# Patient Record
Sex: FEMALE | NOT HISPANIC OR LATINO | URBAN - METROPOLITAN AREA
[De-identification: names, ages, dates, MRNs, and addresses within clinical notes are randomized per-mention and may not be internally consistent; named-entity substitution may affect disease eponyms.]

---

## 2022-11-08 ENCOUNTER — TELEPHONE (OUTPATIENT)
Dept: FAMILY MEDICINE | Facility: CLINIC | Age: 37
End: 2022-11-08
Payer: OTHER GOVERNMENT

## 2022-11-08 NOTE — TELEPHONE ENCOUNTER
----- Message from Monserrat Morris sent at 2022  8:55 AM CST -----  Contact: Self  Heaven Dennis  MRN: [id number]  : 1985  PCP: No primary care provider on file.  Home Phone      [phone]  Work Phone      [phone]  Mobile          [phone]      MESSAGE:  Pt called needing to schedule new pt appt with provider. She is [employer] needing to set up an appt to speak with a doctor about a lump she has in the middle of her shoulder blades in her back making it difficult to sleep at night. Also, she has meds that needs to be refilled.       Phone:  [phone]

## 2022-12-27 ENCOUNTER — LAB VISIT (OUTPATIENT)
Dept: LAB | Facility: HOSPITAL | Age: 37
End: 2022-12-27
Attending: FAMILY MEDICINE
Payer: OTHER GOVERNMENT

## 2022-12-27 ENCOUNTER — OFFICE VISIT (OUTPATIENT)
Dept: FAMILY MEDICINE | Facility: CLINIC | Age: 37
End: 2022-12-27
Payer: OTHER GOVERNMENT

## 2022-12-27 VITALS
SYSTOLIC BLOOD PRESSURE: 129 MMHG | BODY MASS INDEX: 38.46 KG/M2 | HEART RATE: 90 BPM | RESPIRATION RATE: 16 BRPM | DIASTOLIC BLOOD PRESSURE: 84 MMHG | HEIGHT: 66 IN | WEIGHT: 239.31 LBS

## 2022-12-27 DIAGNOSIS — S46.812D STRAIN OF LEFT TRAPEZIUS MUSCLE, SUBSEQUENT ENCOUNTER: Primary | ICD-10-CM

## 2022-12-27 DIAGNOSIS — E28.2 PCOS (POLYCYSTIC OVARIAN SYNDROME): ICD-10-CM

## 2022-12-27 DIAGNOSIS — Z02.1 PHYSICAL EXAM, PRE-EMPLOYMENT: ICD-10-CM

## 2022-12-27 LAB
ALBUMIN SERPL BCP-MCNC: 3.9 G/DL (ref 3.5–5.2)
ALP SERPL-CCNC: 61 U/L (ref 55–135)
ALT SERPL W/O P-5'-P-CCNC: 17 U/L (ref 10–44)
ANION GAP SERPL CALC-SCNC: 10 MMOL/L (ref 8–16)
AST SERPL-CCNC: 19 U/L (ref 10–40)
BACTERIA #/AREA URNS HPF: ABNORMAL /HPF
BASOPHILS # BLD AUTO: 0.06 K/UL (ref 0–0.2)
BASOPHILS NFR BLD: 0.6 % (ref 0–1.9)
BILIRUB SERPL-MCNC: 0.3 MG/DL (ref 0.1–1)
BILIRUB UR QL STRIP: NEGATIVE
BUN SERPL-MCNC: 9 MG/DL (ref 6–20)
CALCIUM SERPL-MCNC: 9.9 MG/DL (ref 8.7–10.5)
CHLORIDE SERPL-SCNC: 104 MMOL/L (ref 95–110)
CHOLEST SERPL-MCNC: 225 MG/DL (ref 120–199)
CHOLEST/HDLC SERPL: 3.6 {RATIO} (ref 2–5)
CLARITY UR: ABNORMAL
CO2 SERPL-SCNC: 25 MMOL/L (ref 23–29)
COLOR UR: YELLOW
CREAT SERPL-MCNC: 0.9 MG/DL (ref 0.5–1.4)
DIFFERENTIAL METHOD: NORMAL
EOSINOPHIL # BLD AUTO: 0.1 K/UL (ref 0–0.5)
EOSINOPHIL NFR BLD: 0.7 % (ref 0–8)
ERYTHROCYTE [DISTWIDTH] IN BLOOD BY AUTOMATED COUNT: 13.5 % (ref 11.5–14.5)
EST. GFR  (NO RACE VARIABLE): >60 ML/MIN/1.73 M^2
ESTIMATED AVG GLUCOSE: 108 MG/DL (ref 68–131)
GLUCOSE SERPL-MCNC: 88 MG/DL (ref 70–110)
GLUCOSE UR QL STRIP: NEGATIVE
HBA1C MFR BLD: 5.4 % (ref 4–5.6)
HCT VFR BLD AUTO: 39.2 % (ref 37–48.5)
HDLC SERPL-MCNC: 63 MG/DL (ref 40–75)
HDLC SERPL: 28 % (ref 20–50)
HGB BLD-MCNC: 13.1 G/DL (ref 12–16)
HGB UR QL STRIP: ABNORMAL
IMM GRANULOCYTES # BLD AUTO: 0.02 K/UL (ref 0–0.04)
IMM GRANULOCYTES NFR BLD AUTO: 0.2 % (ref 0–0.5)
KETONES UR QL STRIP: NEGATIVE
LDH SERPL L TO P-CCNC: 175 U/L (ref 110–260)
LDLC SERPL CALC-MCNC: 145.8 MG/DL (ref 63–159)
LEUKOCYTE ESTERASE UR QL STRIP: ABNORMAL
LYMPHOCYTES # BLD AUTO: 2 K/UL (ref 1–4.8)
LYMPHOCYTES NFR BLD: 20.2 % (ref 18–48)
MCH RBC QN AUTO: 30.8 PG (ref 27–31)
MCHC RBC AUTO-ENTMCNC: 33.4 G/DL (ref 32–36)
MCV RBC AUTO: 92 FL (ref 82–98)
MICROSCOPIC COMMENT: ABNORMAL
MONOCYTES # BLD AUTO: 0.8 K/UL (ref 0.3–1)
MONOCYTES NFR BLD: 7.5 % (ref 4–15)
NEUTROPHILS # BLD AUTO: 7.1 K/UL (ref 1.8–7.7)
NEUTROPHILS NFR BLD: 70.8 % (ref 38–73)
NITRITE UR QL STRIP: POSITIVE
NONHDLC SERPL-MCNC: 162 MG/DL
NRBC BLD-RTO: 0 /100 WBC
PH UR STRIP: 7 [PH] (ref 5–8)
PLATELET # BLD AUTO: 308 K/UL (ref 150–450)
PMV BLD AUTO: 9.7 FL (ref 9.2–12.9)
POTASSIUM SERPL-SCNC: 4.2 MMOL/L (ref 3.5–5.1)
PROT SERPL-MCNC: 7.1 G/DL (ref 6–8.4)
PROT UR QL STRIP: ABNORMAL
RBC # BLD AUTO: 4.26 M/UL (ref 4–5.4)
RBC #/AREA URNS HPF: 5 /HPF (ref 0–4)
SODIUM SERPL-SCNC: 139 MMOL/L (ref 136–145)
SP GR UR STRIP: 1.02 (ref 1–1.03)
SQUAMOUS #/AREA URNS HPF: >100 /HPF
TRIGL SERPL-MCNC: 81 MG/DL (ref 30–150)
TSH SERPL DL<=0.005 MIU/L-ACNC: 1.93 UIU/ML (ref 0.4–4)
URN SPEC COLLECT METH UR: ABNORMAL
UROBILINOGEN UR STRIP-ACNC: NEGATIVE EU/DL
WBC # BLD AUTO: 10.08 K/UL (ref 3.9–12.7)
WBC #/AREA URNS HPF: >100 /HPF (ref 0–5)

## 2022-12-27 PROCEDURE — 81000 URINALYSIS NONAUTO W/SCOPE: CPT | Performed by: FAMILY MEDICINE

## 2022-12-27 PROCEDURE — 80061 LIPID PANEL: CPT | Performed by: FAMILY MEDICINE

## 2022-12-27 PROCEDURE — 83525 ASSAY OF INSULIN: CPT | Performed by: FAMILY MEDICINE

## 2022-12-27 PROCEDURE — 36415 COLL VENOUS BLD VENIPUNCTURE: CPT | Performed by: FAMILY MEDICINE

## 2022-12-27 PROCEDURE — 99999 PR PBB SHADOW E&M-EST. PATIENT-LVL III: CPT | Mod: PBBFAC,,, | Performed by: FAMILY MEDICINE

## 2022-12-27 PROCEDURE — 99999 PR PBB SHADOW E&M-EST. PATIENT-LVL III: ICD-10-PCS | Mod: PBBFAC,,, | Performed by: FAMILY MEDICINE

## 2022-12-27 PROCEDURE — 83036 HEMOGLOBIN GLYCOSYLATED A1C: CPT | Performed by: FAMILY MEDICINE

## 2022-12-27 PROCEDURE — 99213 OFFICE O/P EST LOW 20 MIN: CPT | Mod: PBBFAC | Performed by: FAMILY MEDICINE

## 2022-12-27 PROCEDURE — 80053 COMPREHEN METABOLIC PANEL: CPT | Performed by: FAMILY MEDICINE

## 2022-12-27 PROCEDURE — 83615 LACTATE (LD) (LDH) ENZYME: CPT | Performed by: FAMILY MEDICINE

## 2022-12-27 PROCEDURE — 99203 OFFICE O/P NEW LOW 30 MIN: CPT | Mod: S$PBB,,, | Performed by: FAMILY MEDICINE

## 2022-12-27 PROCEDURE — 85025 COMPLETE CBC W/AUTO DIFF WBC: CPT | Performed by: FAMILY MEDICINE

## 2022-12-27 PROCEDURE — 99203 PR OFFICE/OUTPT VISIT, NEW, LEVL III, 30-44 MIN: ICD-10-PCS | Mod: S$PBB,,, | Performed by: FAMILY MEDICINE

## 2022-12-27 PROCEDURE — 84443 ASSAY THYROID STIM HORMONE: CPT | Performed by: FAMILY MEDICINE

## 2022-12-27 RX ORDER — METHOCARBAMOL 500 MG/1
500 TABLET, FILM COATED ORAL 4 TIMES DAILY
Qty: 40 TABLET | Refills: 0 | Status: SHIPPED | OUTPATIENT
Start: 2022-12-27 | End: 2023-01-06

## 2022-12-27 RX ORDER — METFORMIN HYDROCHLORIDE 500 MG/1
TABLET, EXTENDED RELEASE ORAL
COMMUNITY
End: 2022-12-27

## 2022-12-27 RX ORDER — METFORMIN HYDROCHLORIDE 500 MG/1
1000 TABLET, EXTENDED RELEASE ORAL
Qty: 180 TABLET | Refills: 3 | Status: SHIPPED | OUTPATIENT
Start: 2022-12-27 | End: 2023-05-23

## 2022-12-27 RX ORDER — CYCLOBENZAPRINE HCL 10 MG
10 TABLET ORAL 3 TIMES DAILY
COMMUNITY
Start: 2022-11-07 | End: 2022-12-27

## 2022-12-27 RX ORDER — IBUPROFEN 800 MG/1
800 TABLET ORAL 3 TIMES DAILY
Qty: 21 TABLET | Refills: 0 | Status: SHIPPED | OUTPATIENT
Start: 2022-12-27 | End: 2023-01-03 | Stop reason: SDUPTHER

## 2022-12-27 NOTE — PROGRESS NOTES
Subjective:       Patient ID: Heaven Dennis is a 37 y.o. female.    Chief Complaint: Establish Care    HPI  37 year old female comes in to establish care. She is treated for pcos and has been for some time. She takes metformin and notes that she feels well with this medicine overall. She is currently having issues with her left shoulder - no injury, but radiation of pain from her neck to her shoulder. No improvement/resolution after treatment via UC (Flexeril and prn ibuprofen).    PMH, PSH, ALLERGIES, SH, FH reviewed in nurse's notes above  Medications reconciled in the nurse's notes      Review of Systems   Constitutional:  Negative for chills and fever.   HENT:  Negative for congestion, ear pain, postnasal drip, rhinorrhea, sore throat and trouble swallowing.    Eyes:  Negative for redness and itching.   Respiratory:  Negative for cough, shortness of breath and wheezing.    Cardiovascular:  Negative for chest pain and palpitations.   Gastrointestinal:  Negative for abdominal pain, diarrhea, nausea and vomiting.   Genitourinary:  Negative for dysuria and frequency.   Musculoskeletal:  Positive for myalgias and neck pain.   Skin:  Negative for rash.   Neurological:  Negative for weakness and headaches.     Objective:      Physical Exam  Vitals and nursing note reviewed.   Constitutional:       General: She is not in acute distress.     Appearance: She is well-developed. She is obese.   HENT:      Head: Normocephalic and atraumatic.   Eyes:      Conjunctiva/sclera: Conjunctivae normal.      Pupils: Pupils are equal, round, and reactive to light.   Neck:      Thyroid: No thyromegaly.   Cardiovascular:      Rate and Rhythm: Normal rate and regular rhythm.      Heart sounds: Normal heart sounds.   Pulmonary:      Effort: Pulmonary effort is normal. No respiratory distress.      Breath sounds: Normal breath sounds. No wheezing.   Abdominal:      General: Bowel sounds are normal.      Palpations: Abdomen is soft.       Tenderness: There is no abdominal tenderness.   Musculoskeletal:         General: Tenderness present. Normal range of motion.      Cervical back: Normal range of motion and neck supple.   Lymphadenopathy:      Cervical: No cervical adenopathy.   Skin:     General: Skin is warm and dry.      Findings: No rash.   Neurological:      Mental Status: She is alert and oriented to person, place, and time.   Psychiatric:         Behavior: Behavior normal.        Assessment/Plan:       Problem List Items Addressed This Visit          Endocrine    PCOS (polycystic ovarian syndrome)    Relevant Orders    TSH (Completed)    Hemoglobin A1C (Completed)    INSULIN, RANDOM (Completed)     Other Visit Diagnoses       Strain of left trapezius muscle, subsequent encounter    -  Primary    Physical exam, pre-employment        Relevant Orders    Lipid Panel (Completed)    Comprehensive Metabolic Panel (Completed)    CBC Auto Differential (Completed)    TSH (Completed)    Lactate Dehydrogenase (Completed)    Urinalysis (Completed)        Omsep labs ordered.    Labs for pcos.    Left neck strain - ibuprofen/flexeril - scheduled nsaids. If no improvement, will need pt.    RTC if condition acutely worsens or any other concerns, otherwise RTC as scheduled

## 2022-12-28 LAB
INSULIN COLLECTION INTERVAL: 4
INSULIN SERPL-ACNC: 4.7 UU/ML

## 2022-12-30 RX ORDER — SULFAMETHOXAZOLE AND TRIMETHOPRIM 800; 160 MG/1; MG/1
1 TABLET ORAL 2 TIMES DAILY
Qty: 6 TABLET | Refills: 0 | Status: SHIPPED | OUTPATIENT
Start: 2022-12-30 | End: 2023-01-02

## 2023-01-03 RX ORDER — IBUPROFEN 800 MG/1
800 TABLET ORAL 3 TIMES DAILY
Qty: 21 TABLET | Refills: 0 | Status: SHIPPED | OUTPATIENT
Start: 2023-01-03 | End: 2023-01-10

## 2023-01-03 NOTE — TELEPHONE ENCOUNTER
LOV 12/27/2022    Pt requesting RX refill for ibuprofen (ADVIL,MOTRIN) 800 MG tablet      pharmacy confirmed   rx pending  please advise

## 2023-01-13 ENCOUNTER — TELEPHONE (OUTPATIENT)
Dept: FAMILY MEDICINE | Facility: CLINIC | Age: 38
End: 2023-01-13
Payer: OTHER GOVERNMENT

## 2023-05-23 ENCOUNTER — OFFICE VISIT (OUTPATIENT)
Dept: FAMILY MEDICINE | Facility: CLINIC | Age: 38
End: 2023-05-23
Payer: OTHER GOVERNMENT

## 2023-05-23 VITALS
HEIGHT: 66 IN | RESPIRATION RATE: 16 BRPM | BODY MASS INDEX: 38.39 KG/M2 | SYSTOLIC BLOOD PRESSURE: 116 MMHG | DIASTOLIC BLOOD PRESSURE: 62 MMHG | WEIGHT: 238.88 LBS | HEART RATE: 100 BPM

## 2023-05-23 DIAGNOSIS — E28.2 PCOS (POLYCYSTIC OVARIAN SYNDROME): ICD-10-CM

## 2023-05-23 DIAGNOSIS — E88.819 INSULIN RESISTANCE: Primary | ICD-10-CM

## 2023-05-23 PROCEDURE — 99213 PR OFFICE/OUTPT VISIT, EST, LEVL III, 20-29 MIN: ICD-10-PCS | Mod: S$PBB,,, | Performed by: FAMILY MEDICINE

## 2023-05-23 PROCEDURE — 99213 OFFICE O/P EST LOW 20 MIN: CPT | Mod: S$PBB,,, | Performed by: FAMILY MEDICINE

## 2023-05-23 PROCEDURE — 99999 PR PBB SHADOW E&M-EST. PATIENT-LVL III: CPT | Mod: PBBFAC,,, | Performed by: FAMILY MEDICINE

## 2023-05-23 PROCEDURE — 99213 OFFICE O/P EST LOW 20 MIN: CPT | Mod: PBBFAC | Performed by: FAMILY MEDICINE

## 2023-05-23 PROCEDURE — 99999 PR PBB SHADOW E&M-EST. PATIENT-LVL III: ICD-10-PCS | Mod: PBBFAC,,, | Performed by: FAMILY MEDICINE

## 2023-05-23 RX ORDER — METFORMIN HYDROCHLORIDE 500 MG/1
500 TABLET, EXTENDED RELEASE ORAL EVERY OTHER DAY
Qty: 45 TABLET | Refills: 3 | Status: SHIPPED | OUTPATIENT
Start: 2023-05-23 | End: 2024-05-22

## 2023-05-23 NOTE — PROGRESS NOTES
Subjective:       Patient ID: Heaven Dennis is a 37 y.o. female.    Chief Complaint: Medication Problem (Patient states now that she has lost some weight and has been working out she feels like her mucles are cramping more and she feels more sluggish when she takes her metformin. States she feels better when she doesn't take it.)    HPI  37 year old female comes in to discuss her meds. She has known pcos and notes that she has resumed meformin as discussed at our last visit. She doesn't tolerate this well and has dropped to 1 tab every other day. She has noticed worsening of her cycle since doing this. She is concerned about her weigh t- bmi > 38 and would like to discuss use of a GLP1.    PMH, PSH, ALLERGIES, SH, FH reviewed in nurse's notes above  Medications reconciled in the nurse's notes      Review of Systems   Constitutional:  Positive for unexpected weight change. Negative for chills and fever.   HENT:  Negative for congestion, ear pain, postnasal drip, rhinorrhea, sore throat and trouble swallowing.    Eyes:  Negative for redness and itching.   Respiratory:  Negative for cough, shortness of breath and wheezing.    Cardiovascular:  Negative for chest pain and palpitations.   Gastrointestinal:  Negative for abdominal pain, diarrhea, nausea and vomiting.   Genitourinary:  Positive for menstrual problem. Negative for dysuria and frequency.   Skin:  Negative for rash.   Neurological:  Negative for weakness and headaches.     Objective:      Physical Exam  Vitals and nursing note reviewed.   Constitutional:       General: She is not in acute distress.     Appearance: She is well-developed. She is obese.   HENT:      Head: Normocephalic and atraumatic.   Eyes:      Conjunctiva/sclera: Conjunctivae normal.      Pupils: Pupils are equal, round, and reactive to light.   Neck:      Thyroid: No thyromegaly.   Cardiovascular:      Rate and Rhythm: Normal rate and regular rhythm.      Heart sounds: Normal heart sounds.    Pulmonary:      Effort: Pulmonary effort is normal. No respiratory distress.      Breath sounds: Normal breath sounds. No wheezing.   Abdominal:      General: Bowel sounds are normal.      Palpations: Abdomen is soft.      Tenderness: There is no abdominal tenderness.   Musculoskeletal:         General: Normal range of motion.      Cervical back: Normal range of motion and neck supple.   Lymphadenopathy:      Cervical: No cervical adenopathy.   Skin:     General: Skin is warm and dry.      Findings: No rash.   Neurological:      Mental Status: She is alert and oriented to person, place, and time.   Psychiatric:         Behavior: Behavior normal.        Assessment/Plan:       Problem List Items Addressed This Visit          Endocrine    PCOS (polycystic ovarian syndrome)     Other Visit Diagnoses       Insulin resistance    -  Primary    Relevant Orders    INSULIN, RANDOM        Will try to get semaglutide, pending labs, will use for insulin resistance/pcos vs wegovy for weight.    RTC if condition acutely worsens or any other concerns, otherwise RTC as scheduled

## 2023-05-24 ENCOUNTER — LAB VISIT (OUTPATIENT)
Dept: LAB | Facility: HOSPITAL | Age: 38
End: 2023-05-24
Attending: FAMILY MEDICINE
Payer: OTHER GOVERNMENT

## 2023-05-24 ENCOUNTER — PATIENT MESSAGE (OUTPATIENT)
Dept: FAMILY MEDICINE | Facility: CLINIC | Age: 38
End: 2023-05-24
Payer: OTHER GOVERNMENT

## 2023-05-24 DIAGNOSIS — E88.819 INSULIN RESISTANCE: ICD-10-CM

## 2023-05-24 LAB
INSULIN COLLECTION INTERVAL: NORMAL
INSULIN SERPL-ACNC: 11.7 UU/ML

## 2023-05-24 PROCEDURE — 83525 ASSAY OF INSULIN: CPT | Performed by: FAMILY MEDICINE

## 2023-05-24 PROCEDURE — 36415 COLL VENOUS BLD VENIPUNCTURE: CPT | Performed by: FAMILY MEDICINE

## 2023-06-06 DIAGNOSIS — R73.03 PREDIABETES: Primary | ICD-10-CM

## 2023-06-06 RX ORDER — SEMAGLUTIDE 0.68 MG/ML
0.5 INJECTION, SOLUTION SUBCUTANEOUS
Qty: 3 ML | Refills: 0 | Status: SHIPPED | OUTPATIENT
Start: 2023-06-06 | End: 2023-06-08

## 2023-06-08 RX ORDER — DULAGLUTIDE 0.75 MG/.5ML
0.75 INJECTION, SOLUTION SUBCUTANEOUS
Qty: 4 PEN | Refills: 11 | Status: SHIPPED | OUTPATIENT
Start: 2023-06-08 | End: 2024-06-07

## 2023-06-08 NOTE — PROGRESS NOTES
Spoke with patient and she states that she spoke with [facility] and they state that a form is available to fill out for ozempic.   Patient wanting to know the difference between ozempic and trulicity because she read that ozempic controls sugar better.   Patient will contact insurance to gather more info about these drugs.

## 2023-06-19 ENCOUNTER — PATIENT MESSAGE (OUTPATIENT)
Dept: FAMILY MEDICINE | Facility: CLINIC | Age: 38
End: 2023-06-19
Payer: OTHER GOVERNMENT

## 2023-06-19 NOTE — TELEPHONE ENCOUNTER
Ozempic denied due to patient not having DM2 and trulicity sent in.   Patient states she researched trulicity and decided that is not what she wants to take. Patient has questions about the difference in these medication since trulicity was not discussed in the visit.     Please reach out to patient about these medications.   Thanks!        Case request# [id number]  Denial reason: patient does not have dx of DM2

## 2023-06-21 ENCOUNTER — PATIENT MESSAGE (OUTPATIENT)
Dept: FAMILY MEDICINE | Facility: CLINIC | Age: 38
End: 2023-06-21
Payer: OTHER GOVERNMENT

## 2023-07-11 ENCOUNTER — PATIENT MESSAGE (OUTPATIENT)
Dept: ADMINISTRATIVE | Facility: HOSPITAL | Age: 38
End: 2023-07-11
Payer: OTHER GOVERNMENT

## 2023-07-25 ENCOUNTER — PATIENT OUTREACH (OUTPATIENT)
Dept: ADMINISTRATIVE | Facility: HOSPITAL | Age: 38
End: 2023-07-25
Payer: OTHER GOVERNMENT

## 2023-07-25 NOTE — PROGRESS NOTES
Chart reviewed, immunization record updated.  No new results noted on [facility] or [facility] web site.  Care Everywhere updated.   Patient care coordination note  LOV with PCP 5/23/2023.  Discussed Cervical Cancer Screening with patient, scheduled appointment with Dr. Lina Aragon on 9/29/2023, patient states she will see appointment details through her [facility] portal.

## 2023-11-14 ENCOUNTER — PATIENT OUTREACH (OUTPATIENT)
Dept: ADMINISTRATIVE | Facility: HOSPITAL | Age: 38
End: 2023-11-14
Payer: OTHER GOVERNMENT

## 2023-11-14 NOTE — PROGRESS NOTES
Chart reviewed, Links Registry request failed  No new results noted on [facility] or [facility] web site.  Care Everywhere updated.   Patient care coordination note  LOV with PCP 5/23/2023.  Spoke to patient regarding rescheduling well woman exam, patient states she is currently working in [place] and is unsure when she will return. Patient states she will call when ready to schedule pap smear.    electronic

## 2024-02-28 DIAGNOSIS — R73.03 PREDIABETES: ICD-10-CM

## 2024-03-05 LAB
HPV APTIMA: NEGATIVE
PAP RECOMMENDATION EXT: NORMAL

## 2024-04-08 ENCOUNTER — PATIENT MESSAGE (OUTPATIENT)
Dept: FAMILY MEDICINE | Facility: CLINIC | Age: 39
End: 2024-04-08
Payer: OTHER GOVERNMENT

## 2024-04-08 ENCOUNTER — PATIENT MESSAGE (OUTPATIENT)
Dept: ADMINISTRATIVE | Facility: HOSPITAL | Age: 39
End: 2024-04-08
Payer: OTHER GOVERNMENT

## 2024-04-09 ENCOUNTER — PATIENT OUTREACH (OUTPATIENT)
Dept: ADMINISTRATIVE | Facility: HOSPITAL | Age: 39
End: 2024-04-09
Payer: OTHER GOVERNMENT

## 2024-04-09 NOTE — PROGRESS NOTES
Chart reviewed, Links registry does not have data for this patient.  No new results noted on [facility] web site.  Uploaded Pap Smear/HPV collected on 3/5/2024 from [facility], updated to .  Care Everywhere updated.   Patient care coordination note  Upcoming PCP visit updated.  Next PCP visit 5/2/2024.  Dr. Trinidad Begum added to patient care team.  Patient replied to email campaign stating pap smear collected.

## 2024-05-02 ENCOUNTER — CLINICAL SUPPORT (OUTPATIENT)
Dept: FAMILY MEDICINE | Facility: CLINIC | Age: 39
End: 2024-05-02
Payer: OTHER GOVERNMENT

## 2024-05-02 ENCOUNTER — OFFICE VISIT (OUTPATIENT)
Dept: FAMILY MEDICINE | Facility: CLINIC | Age: 39
End: 2024-05-02
Payer: OTHER GOVERNMENT

## 2024-05-02 VITALS
HEART RATE: 96 BPM | SYSTOLIC BLOOD PRESSURE: 124 MMHG | WEIGHT: 211.56 LBS | RESPIRATION RATE: 12 BRPM | DIASTOLIC BLOOD PRESSURE: 82 MMHG | BODY MASS INDEX: 34 KG/M2 | HEIGHT: 66 IN

## 2024-05-02 DIAGNOSIS — F41.1 GAD (GENERALIZED ANXIETY DISORDER): ICD-10-CM

## 2024-05-02 DIAGNOSIS — F90.0 ATTENTION DEFICIT HYPERACTIVITY DISORDER (ADHD), PREDOMINANTLY INATTENTIVE TYPE: ICD-10-CM

## 2024-05-02 DIAGNOSIS — R41.840 CONCENTRATION DEFICIT: ICD-10-CM

## 2024-05-02 DIAGNOSIS — E28.2 PCOS (POLYCYSTIC OVARIAN SYNDROME): ICD-10-CM

## 2024-05-02 DIAGNOSIS — R41.840 CONCENTRATION DEFICIT: Primary | ICD-10-CM

## 2024-05-02 LAB
ALBUMIN SERPL BCP-MCNC: 4.1 G/DL (ref 3.5–5.2)
ALP SERPL-CCNC: 57 U/L (ref 55–135)
ALT SERPL W/O P-5'-P-CCNC: 12 U/L (ref 10–44)
ANION GAP SERPL CALC-SCNC: 9 MMOL/L (ref 8–16)
AST SERPL-CCNC: 16 U/L (ref 10–40)
BACTERIA SPEC CULT: NORMAL /HPF
BILIRUB SERPL-MCNC: 0.4 MG/DL (ref 0.1–1)
BILIRUB SERPL-MCNC: NORMAL MG/DL
BLOOD URINE, POC: NORMAL
BUN SERPL-MCNC: 13 MG/DL (ref 6–20)
CALCIUM SERPL-MCNC: 10.2 MG/DL (ref 8.7–10.5)
CASTS: NORMAL
CHLORIDE SERPL-SCNC: 107 MMOL/L (ref 95–110)
CO2 SERPL-SCNC: 24 MMOL/L (ref 23–29)
COLOR, POC UA: YELLOW
CREAT SERPL-MCNC: 1 MG/DL (ref 0.5–1.4)
CRYSTALS: NORMAL
EST. GFR  (NO RACE VARIABLE): >60 ML/MIN/1.73 M^2
ESTIMATED AVG GLUCOSE: 108 MG/DL (ref 68–131)
GLUCOSE SERPL-MCNC: 86 MG/DL (ref 70–110)
GLUCOSE UR QL STRIP: NORMAL
HBA1C MFR BLD: 5.4 % (ref 4–5.6)
KETONES UR QL STRIP: NORMAL
LEUKOCYTE ESTERASE URINE, POC: NORMAL
NITRITE, POC UA: NORMAL
PH, POC UA: 6
POTASSIUM SERPL-SCNC: 3.9 MMOL/L (ref 3.5–5.1)
PROT SERPL-MCNC: 7.5 G/DL (ref 6–8.4)
PROTEIN, POC: NORMAL
RBC CELLS COUNTED: NORMAL
SODIUM SERPL-SCNC: 140 MMOL/L (ref 136–145)
SPECIFIC GRAVITY, POC UA: >=1.03
TSH SERPL DL<=0.005 MIU/L-ACNC: 1.24 UIU/ML (ref 0.4–4)
UROBILINOGEN, POC UA: 0.2
VIT B12 SERPL-MCNC: 713 PG/ML (ref 210–950)
WHITE BLOOD CELLS: NORMAL

## 2024-05-02 PROCEDURE — 99213 OFFICE O/P EST LOW 20 MIN: CPT | Mod: PBBFAC | Performed by: FAMILY MEDICINE

## 2024-05-02 PROCEDURE — 81001 URINALYSIS AUTO W/SCOPE: CPT | Mod: PBBFAC

## 2024-05-02 PROCEDURE — 99215 OFFICE O/P EST HI 40 MIN: CPT | Mod: S$PBB,,, | Performed by: FAMILY MEDICINE

## 2024-05-02 PROCEDURE — 99999 PR PBB SHADOW E&M-EST. PATIENT-LVL III: CPT | Mod: PBBFAC,,, | Performed by: FAMILY MEDICINE

## 2024-05-02 PROCEDURE — 36415 COLL VENOUS BLD VENIPUNCTURE: CPT | Mod: PBBFAC

## 2024-05-02 PROCEDURE — 84443 ASSAY THYROID STIM HORMONE: CPT | Performed by: FAMILY MEDICINE

## 2024-05-02 PROCEDURE — 81000 URINALYSIS NONAUTO W/SCOPE: CPT | Mod: PBBFAC

## 2024-05-02 PROCEDURE — 82607 VITAMIN B-12: CPT | Performed by: FAMILY MEDICINE

## 2024-05-02 PROCEDURE — 83036 HEMOGLOBIN GLYCOSYLATED A1C: CPT | Performed by: FAMILY MEDICINE

## 2024-05-02 PROCEDURE — 99999PBSHW PR PBB SHADOW TECHNICAL ONLY FILED TO HB: Mod: PBBFAC,,,

## 2024-05-02 PROCEDURE — 80053 COMPREHEN METABOLIC PANEL: CPT | Performed by: FAMILY MEDICINE

## 2024-05-02 PROCEDURE — 99999PBSHW POCT URINALYSIS, DIPSTICK OR TABLET REAGENT, AUTOMATED, WITH MICROSCOP: Mod: PBBFAC,,,

## 2024-05-02 PROCEDURE — 99999PBSHW POCT URINE SEDIMENT EXAM: Mod: PBBFAC,,,

## 2024-05-02 RX ORDER — IBUPROFEN 800 MG/1
800 TABLET ORAL 3 TIMES DAILY
Qty: 30 TABLET | Refills: 0 | Status: SHIPPED | OUTPATIENT
Start: 2024-05-02 | End: 2024-05-12

## 2024-05-02 RX ORDER — ATOMOXETINE 40 MG/1
40 CAPSULE ORAL DAILY
Qty: 30 CAPSULE | Refills: 0 | Status: SHIPPED | OUTPATIENT
Start: 2024-05-02 | End: 2024-05-28

## 2024-05-02 NOTE — PROGRESS NOTES
Subjective:       Patient ID: Heaven Dennis is a 38 y.o. female.    Chief Complaint: Plantar Fasciitis (Pt here with ongoing pain with plantar fascitis hasn't seen podiatry in 2 years), ADHD (Poss adhd via consular at work  ), and Urinary Tract Infection (Pt here with poss uti no symptoms. Would like test just to be safe. )    History of Present Illness  The patient presents for evaluation of multiple medical concerns.    The patient reports recurrent foot pain, specifically in the heel region. She has a history of plantar fasciitis, with the last episode occurring approximately 2 years ago. Despite receiving an injection and utilizing orthopedic insoles, the pain has recently recurred. She has experienced significant weight loss, which she believes may be contributing to her discomfort. Despite wearing boots daily at work, she continues to utilize the insoles. Despite her efforts, she has refrained from running or engaging in any new activities. The pain is intermittent, but intensifies upon weight-bearing, particularly in the morning. The pain is not severe after prolonged periods of sitting, but intensify upon standing. She has attempted to use a brace and another insole, but finds them less effective with her boots. She has resorted to Advil for intermittent relief.    The patient reports experiencing mental intrusive thoughts. Her counselor suspects she may have ADHD. She has been experiencing significant stress, including filing harassment charges and filing EO complaints. Her mother, who resides with her, is living with her and is in the process of remodeling her house. She is planning to relocate to [place] for 1 year and hopes to drop her jail letter. She reports difficulty remembering dates and frequently writes her mother's name, which she finds stressful.    The patient expresses concern about a possible urinary tract infection (UTI). She recalls a severe UTI during a miscarriage, but did not  experience any dysuria. Her urine has been dark in color, which she attributes to hot weather.    Supplemental Information  She has lost almost 30 pounds through dietary changes. She is on Trulicity for PCOS. She was put on Sonata by a sleep doctor but had to stop because of being [employer] of [employer].   She quit smoking cigarettes in 12/2023, but she still vapes every once in a while.    Objective:      Physical Exam  Vital Signs  Patient's weight is 211.      Physical Exam  Vitals and nursing note reviewed.   Constitutional:       General: She is not in acute distress.     Appearance: She is well-developed.   HENT:      Head: Normocephalic and atraumatic.   Eyes:      Conjunctiva/sclera: Conjunctivae normal.      Pupils: Pupils are equal, round, and reactive to light.   Neck:      Thyroid: No thyromegaly.   Cardiovascular:      Rate and Rhythm: Normal rate and regular rhythm.      Heart sounds: Normal heart sounds.   Pulmonary:      Effort: Pulmonary effort is normal. No respiratory distress.      Breath sounds: Normal breath sounds. No wheezing.   Abdominal:      General: Bowel sounds are normal.      Palpations: Abdomen is soft.      Tenderness: There is no abdominal tenderness.   Musculoskeletal:         General: Tenderness (left mid heel) present. Normal range of motion.      Cervical back: Normal range of motion and neck supple.   Lymphadenopathy:      Cervical: No cervical adenopathy.   Skin:     General: Skin is warm and dry.      Findings: No rash.   Neurological:      Mental Status: She is alert and oriented to person, place, and time.   Psychiatric:         Behavior: Behavior normal.         Results    Assessment:           1. Concentration deficit    2. PCOS (polycystic ovarian syndrome)    3. Attention deficit hyperactivity disorder (ADHD), predominantly inattentive type    4. JAN (generalized anxiety disorder)        Plan:     Assessment & Plan  1. Heel pain.  The patient's heel pain does not align  with plantar fascial pain, and it is plausible that the pain could be attributed to her new boots. A prescription for ibuprofen, to be taken with food for the next 10 days, has been issued. Injection offered - patient deferred.    2. Potential ADHD.  The patient and her counselor have discussed the potential use of stimulants. Neuropsychological testing was suggested as a potential diagnostic option. A referral to psychiatry has been made.    3. Potential UTI.  A urine sample will be collected to exclude the possibility of a UTI.    Heaven was seen today for plantar fasciitis, adhd and urinary tract infection.    Diagnoses and all orders for this visit:    Concentration deficit  -     Hemoglobin A1C; Future  -     Comprehensive Metabolic Panel; Future  -     TSH; Future  -     POCT Urine Sediment Exam; Future  -     POCT URINE DIPSTICK WITH MICROSCOPE, AUTOMATED; Future  -     Vitamin B12; Future  -     Ambulatory referral/consult to Psychiatry; Future    PCOS (polycystic ovarian syndrome)  -     Hemoglobin A1C; Future  -     Comprehensive Metabolic Panel; Future  -     TSH; Future    Attention deficit hyperactivity disorder (ADHD), predominantly inattentive type  -     Ambulatory referral/consult to Psychiatry; Future    JAN (generalized anxiety disorder)  -     Ambulatory referral/consult to Psychiatry; Future    Other orders  -     ibuprofen (ADVIL,MOTRIN) 800 MG tablet; Take 1 tablet (800 mg total) by mouth 3 (three) times daily. for 10 days      38 minutes spent with pateint    RTC if condition acutely worsens or any other concerns, otherwise RTC as scheduled      This note was generated with the assistance of ambient listening technology. Verbal consent was obtained by the patient and accompanying visitor(s) for the recording of patient appointment to facilitate this note. I attest to having reviewed and edited the generated note for accuracy, though some syntax or spelling errors may persist. Please  contact the author of this note for any clarification.    Answers submitted by the patient for this visit:  Review of Systems Questionnaire (Submitted on 4/29/2024)  activity change: Yes  unexpected weight change: No  hearing loss: Yes  rhinorrhea: Yes  trouble swallowing: No  eye discharge: No  visual disturbance: No  chest tightness: No  wheezing: No  chest pain: No  palpitations: No  blood in stool: No  constipation: Yes  vomiting: No  diarrhea: Yes  polydipsia: No  polyuria: Yes  difficulty urinating: Yes  hematuria: No  menstrual problem: No  dysuria: No  joint swelling: No  arthralgias: No  headaches: Yes  weakness: No  confusion: Yes  dysphoric mood: Yes

## 2024-05-06 ENCOUNTER — PATIENT MESSAGE (OUTPATIENT)
Dept: FAMILY MEDICINE | Facility: CLINIC | Age: 39
End: 2024-05-06
Payer: OTHER GOVERNMENT

## 2024-05-06 DIAGNOSIS — F33.1 MODERATE EPISODE OF RECURRENT MAJOR DEPRESSIVE DISORDER: Primary | ICD-10-CM

## 2024-05-06 NOTE — TELEPHONE ENCOUNTER
Psychiatry provider pt was sent to - dr.Venkata Cline in [place] is no longer practicing there do you have any other recommendations for pt?

## 2024-05-13 NOTE — TELEPHONE ENCOUNTER
Patient asking for referral to Psychiatrist be sent to someone else as the original provider is no longer in that clinic.      Please advise

## 2024-05-25 NOTE — TELEPHONE ENCOUNTER
No care due was identified.  [facility] Embedded Care Due Messages. Reference number: [id number].   5/25/2024 9:32:14 AM CDT

## 2024-05-27 DIAGNOSIS — R73.03 PREDIABETES: ICD-10-CM

## 2024-05-27 RX ORDER — DULAGLUTIDE 0.75 MG/.5ML
0.75 INJECTION, SOLUTION SUBCUTANEOUS
Qty: 12 PEN | Refills: 1 | Status: SHIPPED | OUTPATIENT
Start: 2024-05-27 | End: 2025-05-27

## 2024-05-27 NOTE — TELEPHONE ENCOUNTER
No care due was identified.  [facility] Embedded Care Due Messages. Reference number: [id number].   5/27/2024 12:00:59 AM CDT

## 2024-05-27 NOTE — TELEPHONE ENCOUNTER
Refill Decision Note   Heaven Dennis  is requesting a refill authorization.  Brief Assessment and Rationale for Refill:  Approve     Medication Therapy Plan:         Comments:     Note composed:11:54 AM 05/27/2024

## 2024-05-28 RX ORDER — ATOMOXETINE 40 MG/1
40 CAPSULE ORAL
Qty: 90 CAPSULE | Refills: 1 | Status: SHIPPED | OUTPATIENT
Start: 2024-05-28

## 2024-05-29 ENCOUNTER — PATIENT MESSAGE (OUTPATIENT)
Dept: FAMILY MEDICINE | Facility: CLINIC | Age: 39
End: 2024-05-29
Payer: OTHER GOVERNMENT

## 2024-06-05 ENCOUNTER — PATIENT MESSAGE (OUTPATIENT)
Dept: FAMILY MEDICINE | Facility: CLINIC | Age: 39
End: 2024-06-05
Payer: OTHER GOVERNMENT

## 2024-06-13 ENCOUNTER — PATIENT MESSAGE (OUTPATIENT)
Dept: FAMILY MEDICINE | Facility: CLINIC | Age: 39
End: 2024-06-13
Payer: OTHER GOVERNMENT

## 2024-06-13 DIAGNOSIS — M72.2 PLANTAR FASCIITIS OF RIGHT FOOT: Primary | ICD-10-CM

## 2024-06-25 ENCOUNTER — OFFICE VISIT (OUTPATIENT)
Dept: FAMILY MEDICINE | Facility: CLINIC | Age: 39
End: 2024-06-25
Payer: OTHER GOVERNMENT

## 2024-06-25 VITALS
WEIGHT: 214.31 LBS | DIASTOLIC BLOOD PRESSURE: 80 MMHG | RESPIRATION RATE: 12 BRPM | SYSTOLIC BLOOD PRESSURE: 114 MMHG | HEIGHT: 66 IN | BODY MASS INDEX: 34.44 KG/M2 | HEART RATE: 88 BPM

## 2024-06-25 DIAGNOSIS — S39.012A STRAIN OF LUMBAR REGION, INITIAL ENCOUNTER: Primary | ICD-10-CM

## 2024-06-25 DIAGNOSIS — M25.511 ACUTE PAIN OF RIGHT SHOULDER: ICD-10-CM

## 2024-06-25 PROCEDURE — 99999PBSHW PR PBB SHADOW TECHNICAL ONLY FILED TO HB: Mod: PBBFAC,,,

## 2024-06-25 PROCEDURE — 96372 THER/PROPH/DIAG INJ SC/IM: CPT | Mod: PBBFAC

## 2024-06-25 PROCEDURE — 99213 OFFICE O/P EST LOW 20 MIN: CPT | Mod: PBBFAC,25 | Performed by: FAMILY MEDICINE

## 2024-06-25 PROCEDURE — 99999 PR PBB SHADOW E&M-EST. PATIENT-LVL III: CPT | Mod: PBBFAC,,, | Performed by: FAMILY MEDICINE

## 2024-06-25 PROCEDURE — 99214 OFFICE O/P EST MOD 30 MIN: CPT | Mod: S$PBB,,, | Performed by: FAMILY MEDICINE

## 2024-06-25 RX ORDER — IBUPROFEN 800 MG/1
800 TABLET ORAL 3 TIMES DAILY
Qty: 30 TABLET | Refills: 0 | Status: SHIPPED | OUTPATIENT
Start: 2024-06-25

## 2024-06-25 RX ORDER — METHYLPREDNISOLONE ACETATE 40 MG/ML
40 INJECTION, SUSPENSION INTRA-ARTICULAR; INTRALESIONAL; INTRAMUSCULAR; SOFT TISSUE
Status: COMPLETED | OUTPATIENT
Start: 2024-06-25 | End: 2024-06-25

## 2024-06-25 RX ORDER — METHOCARBAMOL 500 MG/1
500 TABLET, FILM COATED ORAL 4 TIMES DAILY
Qty: 40 TABLET | Refills: 0 | Status: SHIPPED | OUTPATIENT
Start: 2024-06-25 | End: 2024-07-05

## 2024-06-25 RX ORDER — ATOMOXETINE 25 MG/1
25 CAPSULE ORAL DAILY
Qty: 30 CAPSULE | Refills: 0 | Status: SHIPPED | OUTPATIENT
Start: 2024-06-25 | End: 2024-07-25

## 2024-06-25 RX ADMIN — METHYLPREDNISOLONE ACETATE 40 MG: 40 INJECTION, SUSPENSION INTRA-ARTICULAR; INTRALESIONAL; INTRAMUSCULAR; SOFT TISSUE at 09:06

## 2024-06-25 NOTE — PROGRESS NOTES
Subjective:       Patient ID: Heaven Dennis is a 39 y.o. female.    Chief Complaint: Follow-up (1 month new medication follow up)    History of Present Illness   H    The patient experienced a fall at the end of 05/2023, during which she attempted to catch herself by grabbing a railing. Since then, she has been experiencing persistent pain in her right shoulder and bilateral lower sacral area, which radiates down her posterior right side. The pain does not extend beyond her knees, but she also suffers from plantar fasciitis, which radiates upwards, making it challenging to discern whether the pain is fully descending or radiating upward. The pain, which she describes as sharp, tingling, and spasms, intensifies when she stands or stretches after sitting for more than 30 minutes. Despite taking ibuprofen 800 mg twice daily, she has not observed any improvement in her symptoms. She has not yet tried any muscle relaxants. The fall did not result in significant bruising, but she noticed slight swelling and tightness in her back. Currently, she experiences a general tightness in her back, which is exacerbated by sitting, driving, and sleeping. She has a lift bed system, which she uses to elevate her feet, which provides some relief. However, she continues to experience tightness in her shoulder with movement. She has not observed any swelling in her leg.er concern is that she has a family history of blood clots and so that is her main concern. She wants to rule that out, but that is the only thing that she is here for and a medication review.   She vapes. She has 1 baby. She has had 3 miscarriages.   She has a family history of blood clots on her mother's side. Her mother has back issues.    Objective:      Physical Exam        Physical Exam  Vitals and nursing note reviewed.   Constitutional:       General: She is not in acute distress.     Appearance: She is well-developed.   HENT:      Head: Normocephalic and  atraumatic.   Eyes:      Conjunctiva/sclera: Conjunctivae normal.      Pupils: Pupils are equal, round, and reactive to light.   Neck:      Thyroid: No thyromegaly.   Cardiovascular:      Rate and Rhythm: Normal rate and regular rhythm.      Heart sounds: Normal heart sounds.   Pulmonary:      Effort: Pulmonary effort is normal. No respiratory distress.      Breath sounds: Normal breath sounds. No wheezing.   Abdominal:      General: Bowel sounds are normal.      Palpations: Abdomen is soft.      Tenderness: There is no abdominal tenderness.   Musculoskeletal:         General: Tenderness present. No swelling or deformity. Normal range of motion.      Cervical back: Normal range of motion and neck supple.   Lymphadenopathy:      Cervical: No cervical adenopathy.   Skin:     General: Skin is warm and dry.      Findings: No rash.   Neurological:      Mental Status: She is alert and oriented to person, place, and time.   Psychiatric:         Behavior: Behavior normal.         Results    Assessment:           1. Strain of lumbar region, initial encounter    2. Acute pain of right shoulder        Plan:     Assessment & Plan  1. Right shoulder pain, lumbar pain and hamstring strain.  Given the patient's family history of blood clots, further investigation is warranted - she has been asked to find out the etiology of her family's clotting hx. A regimen of ibuprofen, to be taken thrice daily for a week, has been prescribed. Additionally, Robaxin has been prescribed for muscle relaxation. A steroid injection will be administered today. Should the patient's condition not improve within a week, an x-ray of the back will be conducted, followed by a discussion regarding an MRI. We will initiate physical therapy.    Heaven was seen today for follow-up.    Diagnoses and all orders for this visit:    Strain of lumbar region, initial encounter  -     Ambulatory referral/consult to Physical/Occupational Therapy; Future  -      methylPREDNISolone acetate injection 40 mg    Acute pain of right shoulder  -     Ambulatory referral/consult to Physical/Occupational Therapy; Future  -     methylPREDNISolone acetate injection 40 mg    Other orders  -     atomoxetine (STRATTERA) 25 MG capsule; Take 1 capsule (25 mg total) by mouth once daily.  -     ibuprofen (ADVIL,MOTRIN) 800 MG tablet; Take 1 tablet (800 mg total) by mouth 3 (three) times daily.  -     methocarbamoL (ROBAXIN) 500 MG Tab; Take 1 tablet (500 mg total) by mouth 4 (four) times daily. for 10 days      Adhd - too sleepy on current dose of strattera.    PCOS - doing well on trulicity.    RTC if condition acutely worsens or any other concerns, otherwise RTC as scheduled      This note was generated with the assistance of ambient listening technology. Verbal consent was obtained by the patient and accompanying visitor(s) for the recording of patient appointment to facilitate this note. I attest to having reviewed and edited the generated note for accuracy, though some syntax or spelling errors may persist. Please contact the author of this note for any clarification.

## 2024-06-26 DIAGNOSIS — Z12.31 OTHER SCREENING MAMMOGRAM: ICD-10-CM

## 2024-07-03 ENCOUNTER — PATIENT MESSAGE (OUTPATIENT)
Dept: FAMILY MEDICINE | Facility: CLINIC | Age: 39
End: 2024-07-03
Payer: OTHER GOVERNMENT

## 2024-07-03 ENCOUNTER — CLINICAL SUPPORT (OUTPATIENT)
Dept: REHABILITATION | Facility: HOSPITAL | Age: 39
End: 2024-07-03
Attending: FAMILY MEDICINE
Payer: OTHER GOVERNMENT

## 2024-07-03 DIAGNOSIS — M72.2 PLANTAR FASCIITIS OF RIGHT FOOT: Primary | ICD-10-CM

## 2024-07-03 DIAGNOSIS — S39.012A STRAIN OF LUMBAR REGION, INITIAL ENCOUNTER: ICD-10-CM

## 2024-07-03 DIAGNOSIS — M25.311 SHOULDER INSTABILITY, RIGHT: Primary | ICD-10-CM

## 2024-07-03 DIAGNOSIS — R29.898 WEAKNESS OF RIGHT SHOULDER: ICD-10-CM

## 2024-07-03 DIAGNOSIS — M25.511 ACUTE PAIN OF RIGHT SHOULDER: ICD-10-CM

## 2024-07-03 PROCEDURE — 97165 OT EVAL LOW COMPLEX 30 MIN: CPT | Mod: PN

## 2024-07-03 PROCEDURE — 97110 THERAPEUTIC EXERCISES: CPT | Mod: PN

## 2024-07-03 NOTE — PLAN OF CARE
"  [facility]  Occupational Therapy Initial Evaluation    Date: 7/3/2024  Name: Heaven Dennis  Clinic Number: [id number]    Therapy Diagnosis:   Encounter Diagnoses   Name Primary?    Strain of lumbar region, initial encounter     Acute pain of right shoulder     Shoulder instability, right Yes    Weakness of right shoulder      Physician: Leilani Pat MD    Physician Orders: OT Evaluate and treat  Medical Diagnosis: Shoulder pain  Surgical Procedure and Date: NA  Evaluation Date: 7/3/2024  Insurance Authorization Period Expiration: 6/25/2024 - 6/25/2025  Plan of Care Certification Period: 6/25/2024 - 6/25/2025  Progress Note Due: 7/25/2024   Date of Return to MD: Unknown    Visit # / Visits authorized: Evaluation  FOTO: 1 / 3    Time In:10:30  Time Out: 11:15  Total Appointment Time (timed & untimed codes): 45 minutes    SUBJECTIVE     Date of Onset: ~2 months ago    History of Current Condition/Mechanism of Injury: Heaven reports: Labral surgery in 2014. She states that since this surgery she has experienced some mild pain and popping to right shoulder which she has "just lived with." However, ~2 months ago she lost her balance climbing latter at work and fell catching herself with her right upper extremity. She felt a tight pulling and pop and has been experiencing increased pain, popping, and instability since. She states that symptoms are similar to what she was experiencing prior to labral repair.     Falls: Yes    Involved Side: right   Dominant Side: Right  Imaging: Nothing recent  Prior Therapy: Previous OT following labral tear in 2014.   Occupation: [employer]  Working presently: employed  Duties:      Functional Limitations/Social History:    Previous functional status includes: Independent with all ADLs.     Current Functional Status   Home/Living environment: lives with their family      Limitation of Functional Status as follows:   ADLs/IADLs: "     - Feeding: Ind    - Bathing: MOD I    - Dressing/Grooming: MOD I    - Driving: MOD I    Pain:  Functional Pain Scale Rating 0-10:     Rest 4/10,   worst 8/10     Location: Along supraspinatus and anterior  Description: Aching, Dull, and Sharp  Aggravating Factors: Morning, Lifting, and overhead reaching.  Easing Factors: rest    Medical History:   Past Medical History:   Diagnosis Date    PCOS (polycystic ovarian syndrome)        Surgical History:    has a past surgical history that includes Fracture surgery and Cosmetic surgery.    Medications:   has a current medication list which includes the following prescription(s): atomoxetine, ibuprofen, methocarbamol, and trulicity.    Allergies:   Review of patient's allergies indicates:   Allergen Reactions    Penicillins Other (See Comments) and Anaphylaxis     unknown      Codeine Nausea And Vomiting          OBJECTIVE       Special Tests   Right   Shahid Adrian Neg   Neer's Neg   Lift Off   neg   Belly Press    Rincon Pos   Speed's Pos   Empty/Full Can Neg   Drop Arm    Painful Arc Neg   Hornblowers Neg        Active Range of Motion:  (Measured in degrees)   Right Left   Shld Flexion 155 160   Shld Extension 45 50   Shld Abduction 160 170   Shld Adduction 0 0   Shld Ext Rotation 80 90   Shld Int Rotation 75 80     Manual Muscle Test   Right Left   Shld Flexion -4/5* 5/5   Shld Extension 5/5 5/5   Shld Abduction 4-/5* 5/5   Shld Adduction 4-/5* 5/5   Shld Ext Rotation 5/5 5/5   Shld Int Rotation 5/5 5/5   Elbow Flexion 5/5 5/5   Elbow Extension 5/5 5/5        Strength (Dynamometer/Measured in pounds on Rung II) and Pinch Strength (Pinch Gauge)   7/3/2024 7/3/2024    Right Left   Composite 60 65   Lateral Pinch 10 12   Tripod Pinch 5 8       Opposition (Fine Motor Skills/Dexterity):   9 hole   - right: 21.92   - left: 18.10     Wound/Scar management: NA    Sensation: Pt reports no numbness currently but she does still experience some intermittent numbness to  median nerve distribution ever since carpal tunnel release in 2019.     Limitation/Restriction for FOTO Survey    Therapist reviewed FOTO scores for Heaven Dennis on 7/3/2024.   FOTO documents entered into EPIC - see Media section.    Limitation Score: See media section         Treatment   Total Treatment time (time-based codes) separate from Evaluation: 10 minutes    Heaven received the treatments listed below:     Therapeutic exercises to develop strength and ROM for 10 minutes, including:  - Shoulder shru reps 3#  - Shoulder retraction: 30 reps green band  - external rotation: 30 reps green band      Patient Education and Home Exercises      Education provided:   - HEP    Written Home Exercises Provided: yes.  Exercises were reviewed and Heaven was able to demonstrate them prior to the end of the session.  Heaven demonstrated good  understanding of the education provided. See EMR under Patient Instructions for exercises provided during therapy sessions.     Pt was advised to perform these exercises free of pain, and to stop performing them if pain occurs.    Patient/Family Education: role of OT, goals for OT, scheduling/cancellations - pt verbalized understanding. Discussed insurance limitations with patient.      ASSESSMENT     Heaven Dennis is a 39 y.o. female referred to outpatient occupational therapy and presents with a medical diagnosis of right shoulder pain.  Patient presents with the following therapy deficits: Decreased ROM, Decreased muscle strength, Increased pain, and shoulder instability and demonstrates limitations as described in the chart below. Following medical record review it is determined that pt will benefit from occupational therapy services in order to maximize pain free and/or functional use of right shoulder. The following goals were discussed with the patient and patient is in agreement with them as to be addressed in the treatment plan. The patient's rehab potential  is Good.     Anticipated barriers to occupational therapy: None  Pt has no cultural, educational or language barriers to learning provided.    Profile and History Assessment of Occupational Performance Level of Clinical Decision Making Complexity Score   Occupational Profile:   Heaven Dennis is a 39 y.o. female who lives with their family and is currently employed Heaven Dennis has difficulty with  ADLs and IADLs as listed previously, which  Affecting herdaily functional abilities.      Comorbidities:    has a past medical history of PCOS (polycystic ovarian syndrome).    Medical and Therapy History Review:   Brief               Performance Deficits    Physical:  Joint Mobility  Joint Stability  Muscle Power/Strength  Pain    Cognitive:  No Deficits    Psychosocial:    No Deficits     Clinical Decision Making:  low    Assessment Process:  Problem-Focused Assessments    Modification/Need for Assistance:  Not Necessary    Intervention Selection:  Limited Treatment Options       low  Based on PMHX, co morbidities , data from assessments and functional level of assistance required with task and clinical presentation directly impacting function.       The following goals were discussed with the patient and patient is in agreement with them as to be addressed in the treatment plan.       Goals:   The following goals were discussed with the patient and patient is in agreement with them as to be addressed in the treatment plan.     Short Term Goals (STGs); to be met within 4 weeks.  STG #1: Pt will report 6 out of 10 pain level at worst in right upper extremity.  STG #2: Pt will increase shoulder MMT to 4+/5 in order to improve functional use of UE.  STG #4: Pt will demonstrate independence with issued HEP.     Long Term Goals (LTGs); to be met by discharge.  LTG #1: Pt will report a pain level of 3 out of 10 at worst in right upper extremity.   LTG #2: Pt will demo improved FOTO score to predicted level.  LTG #3: Pt  will return to prior level of function for ADL.   LTG #4: Pt will increase shoulder MMT to 5/5 in all planes for improved functional use of upper extremity.   PLAN   Plan of Care Certification: 7/3/2024 to 9/3/2024.     Outpatient Occupational Therapy 1 - 2 times weekly for 8 weeks to include the following interventions: Manual therapy/joint mobilizations, Modalities for pain management, US 3 mhz, Therapeutic exercises/activities., Strengthening, Edema Control, Electrical Modalities, and Joint Protection.      Herson Collins OT      I CERTIFY THE NEED FOR THESE SERVICES FURNISHED UNDER THIS PLAN OF TREATMENT AND WHILE UNDER MY CARE  Physician's comments:      Physician's Signature: ___________________________________________________

## 2024-07-11 ENCOUNTER — CLINICAL SUPPORT (OUTPATIENT)
Dept: REHABILITATION | Facility: HOSPITAL | Age: 39
End: 2024-07-11
Payer: OTHER GOVERNMENT

## 2024-07-11 DIAGNOSIS — M25.311 SHOULDER INSTABILITY, RIGHT: Primary | ICD-10-CM

## 2024-07-11 PROCEDURE — 97110 THERAPEUTIC EXERCISES: CPT | Mod: PN

## 2024-07-11 PROCEDURE — 97035 APP MDLTY 1+ULTRASOUND EA 15: CPT | Mod: PN

## 2024-07-11 NOTE — TELEPHONE ENCOUNTER
Provider out of the clinic, patient found podiatrist that takes her  insurance. Needs referral sent in .      Please advise

## 2024-07-11 NOTE — PROGRESS NOTES
[facility]  Occupational Therapy Treatment Note    Date: 7/11/2024  Name: Heaven Dennis  Clinic Number: [id number]    Therapy Diagnosis: No diagnosis found.  Physician: Leilani Pat MD      Physician Orders: OT Evaluate and treat  Medical Diagnosis: Shoulder pain  Surgical Procedure and Date: NA  Evaluation Date: 7/3/2024  Insurance Authorization Period Expiration: 6/21/2024 - 10/19/2025  Plan of Care Certification Period: 6/25/2024 - 6/25/2025  Progress Note Due: 7/25/2024   Date of Return to MD: Unknown       Visit # / Visits authorized:   1   /  15      Precautions:  none    Time In: 1:54  Time Out: 2:35  Total Billable Time: 41 minutes    SUBJECTIVE     Pt reports: no change  She was compliant with home exercise program given last session.     Response to previous treatment:n/a      Pain:   1/10 at rest    5 /10 with functional use  Location: right shoulder      OBJECTIVE    Measurements updated at progress report unless specified.    Treatment     Heaven received the treatments listed below:     Supervised modalities after being cleared for contradictions for 8 minutes to include:    - MHP x 8 min (R) shoulder to reduce pain and inflammation and increase soft tissue stretch needed for joint mobility exercises.    ** with concurrent  strength exercises blue digiflex 3x10    Direct contact modalities after being cleared for contraindications for 8 minutes to include:    - Ultrasound 100% 3.3 mhz 1.3 cm2 x 5 min to (R) shoulder anterior capsule and medial deltoid region to reduce inflammation and pain post session    Therapeutic exercises to develop strength, endurance, ROM, flexibility, posture, and core stabilization for 25 min to include:  ** frequent rest breaks given**   - scapular stability and strengthening 5# 3x10 each with a 3 sec place and hold   -elevation   -depression   -retraction   - ER supine on mat with green theraband 3x10   - ABC activities to  "posterior capsule stabilization supine on mat   - cable machine activities of posterior capsule strength   - Bicep/tricep strengthen red theraband standing 3x10       Patient Education and Home Exercises      Education provided:   - HEP of posterior capsule stability and strength  - Progress towards goals     Written Home Exercises Provided: yes.  Exercises were reviewed and Heaven was able to demonstrate them prior to the end of the session.  Heaven demonstrated good  understanding of the HEP provided. See EMR under Patient Instructions for exercises provided during therapy sessions.       Assessment     Heaven is progressing well towards her goals and there are no updates to goals at this time. Pt prognosis is Good. She displayed continue "popping" one occasion with anterior instability and posterior weakness.  She displayed good tolerance to session and stated understanding of her HEP  Pt will continue to benefit from skilled outpatient occupational therapy to address the deficits listed in the problem list on initial evaluation provide pt/family education and to maximize pt's level of independence in the home and community environment. Pt's spiritual, cultural and educational needs considered and pt agreeable to plan of care and goals.    Anticipated barriers to occupational therapy: none      Goals:   The following goals were discussed with the patient and patient is in agreement with them as to be addressed in the treatment plan.      Short Term Goals (STGs); to be met within 4 weeks.  STG #1: Pt will report 6 out of 10 pain level at worst in right upper extremity.  STG #2: Pt will increase shoulder MMT to 4+/5 in order to improve functional use of UE.  STG #4: Pt will demonstrate independence with issued HEP.      Long Term Goals (LTGs); to be met by discharge.  LTG #1: Pt will report a pain level of 3 out of 10 at worst in right upper extremity.   LTG #2: Pt will demo improved FOTO score to " predicted level.  LTG #3: Pt will return to prior level of function for ADL.   LTG #4: Pt will increase shoulder MMT to 5/5 in all planes for improved functional use of upper extremity.   PLAN   Plan of Care Certification: 7/3/2024 to 9/3/2024.      Outpatient Occupational Therapy 1 - 2 times weekly for 8 weeks to include the following interventions: Manual therapy/joint mobilizations, Modalities for pain management, US 3 mhz, Therapeutic exercises/activities., Strengthening, Edema Control, Electrical Modalities, and Joint Protection.       Funmilayo Mejia, OT

## 2024-07-15 ENCOUNTER — CLINICAL SUPPORT (OUTPATIENT)
Dept: REHABILITATION | Facility: HOSPITAL | Age: 39
End: 2024-07-15
Payer: OTHER GOVERNMENT

## 2024-07-15 ENCOUNTER — PATIENT MESSAGE (OUTPATIENT)
Dept: ADMINISTRATIVE | Facility: HOSPITAL | Age: 39
End: 2024-07-15
Payer: OTHER GOVERNMENT

## 2024-07-15 DIAGNOSIS — R29.898 WEAKNESS OF RIGHT SHOULDER: ICD-10-CM

## 2024-07-15 DIAGNOSIS — M25.311 SHOULDER INSTABILITY, RIGHT: ICD-10-CM

## 2024-07-15 DIAGNOSIS — M25.511 ACUTE PAIN OF RIGHT SHOULDER: Primary | ICD-10-CM

## 2024-07-15 PROCEDURE — 97035 APP MDLTY 1+ULTRASOUND EA 15: CPT | Mod: PN

## 2024-07-15 PROCEDURE — 97110 THERAPEUTIC EXERCISES: CPT | Mod: PN

## 2024-07-15 PROCEDURE — 97124 MASSAGE THERAPY: CPT | Mod: PN

## 2024-07-15 NOTE — PROGRESS NOTES
[facility]  Occupational Therapy Treatment Note    Date: 7/15/2024  Name: Heaven Dennis  Clinic Number: [id number]    Therapy Diagnosis:   Encounter Diagnoses   Name Primary?    Acute pain of right shoulder Yes    Shoulder instability, right     Weakness of right shoulder      Physician: Leilani Pat MD    Physician Orders: OT Evaluate and treat  Medical Diagnosis: Shoulder pain  Surgical Procedure and Date: NA  Evaluation Date: 7/3/2024  Insurance Authorization Period Expiration: 6/21/2024 - 10/19/2025  Plan of Care Certification Period: 6/25/2024 - 8/25/2025  Progress Note Due: 7/25/2024   Date of Return to MD: Unknown     Visit # / Visits authorized:   2   /  15    Precautions:  none    Time In: 10:15  Time Out: 11:00  Total Billable Time: 45 minutes    SUBJECTIVE     Pt reports: no change  She was compliant with home exercise program given last session.     Response to previous treatment:n/a      Pain:   1/10 at rest    5 /10 with functional use  Location: right shoulder      OBJECTIVE    Measurements updated at progress report unless specified.    Treatment     Heaven received the treatments listed below:     Supervised modalities after being cleared for contradictions for 10 minutes to include:    - MHP x 8 min (R) shoulder to reduce pain and inflammation and increase soft tissue stretch needed for joint mobility exercises.    ** with concurrent  strength exercises blue digiflex 50 reps   - individual: 30 reps   - Blue weighted clip: tripod and lateral 30 reps each    Therapeutic exercises to develop strength, endurance, ROM, flexibility, posture, and core stabilization for 17 min to include:  ** frequent rest breaks given**  - Supine proximal stabilization: 2 x 2 minutes red band  - Supine serratus punches: 3 x 10 3#  - ABC: Supine with 1# weight  - Seated external rotation with red band: 3 x 12 red band   - Row: 3 x 10 3# cables  - Shrug: 3 x 10 3# cables      Trigger point massage: Applied to right trapezius to relieve trigger point and tenderness for 8 minutes to relieve pain and inflammation.    Direct contact modalities after being cleared for contraindications for 10 minutes to include:    - Ultrasound 100% 3.3 mhz 1.3 cm2 x 10 min to (R) shoulder posterior capsule and trapezius region to reduce inflammation and pain post session  Patient Education and Home Exercises      Education provided:   - HEP of posterior capsule stability and strength  - Progress towards goals     Written Home Exercises Provided: yes.  Exercises were reviewed and Heaven was able to demonstrate them prior to the end of the session.  Heaven demonstrated good  understanding of the HEP provided. See EMR under Patient Instructions for exercises provided during therapy sessions.       Assessment     Heaven is progressing well towards her goals and there are no updates to goals at this time. Pt prognosis is Good. Pt displaying continued shoulder instability with minor pain noted to trapezius today. Pain noted with resisted shrugs today with modifications made. Mild relief noted following trigger point massage to trapezius. Pt will continue to benefit from skilled outpatient occupational therapy to address the deficits listed in the problem list on initial evaluation provide pt/family education and to maximize pt's level of independence in the home and community environment. Pt's spiritual, cultural and educational needs considered and pt agreeable to plan of care and goals.    Anticipated barriers to occupational therapy: none      Goals:   The following goals were discussed with the patient and patient is in agreement with them as to be addressed in the treatment plan.      Short Term Goals (STGs); to be met within 4 weeks.  STG #1: Pt will report 6 out of 10 pain level at worst in right upper extremity.  STG #2: Pt will increase shoulder MMT to 4+/5 in order to improve functional use  of UE.  STG #4: Pt will demonstrate independence with issued HEP.      Long Term Goals (LTGs); to be met by discharge.  LTG #1: Pt will report a pain level of 3 out of 10 at worst in right upper extremity.   LTG #2: Pt will demo improved FOTO score to predicted level.  LTG #3: Pt will return to prior level of function for ADL.   LTG #4: Pt will increase shoulder MMT to 5/5 in all planes for improved functional use of upper extremity.   PLAN   Plan of Care Certification: 7/3/2024 to 9/3/2024.      Outpatient Occupational Therapy 1 - 2 times weekly for 8 weeks to include the following interventions: Manual therapy/joint mobilizations, Modalities for pain management, US 3 mhz, Therapeutic exercises/activities., Strengthening, Edema Control, Electrical Modalities, and Joint Protection.       Herson Collins, OT

## 2024-07-17 RX ORDER — ATOMOXETINE 25 MG/1
25 CAPSULE ORAL
Qty: 90 CAPSULE | Refills: 1 | Status: SHIPPED | OUTPATIENT
Start: 2024-07-17

## 2024-07-17 NOTE — TELEPHONE ENCOUNTER
----- Message from Maxwell Thomason sent at 2024 12:18 PM CDT -----  Contact: Patient  Heaven Dennis  MRN: [id number]  : 1985  PCP: Leilani Pat  Home Phone      [phone]  Work Phone      [phone]  Mobile          [phone]      MESSAGE: Referral to Behavioral Health in [place] not received -- please re-send as soon as possible     Call [phone]    PCP: Adilia

## 2024-07-17 NOTE — TELEPHONE ENCOUNTER
Care Due:                  Date            Visit Type   Department     Provider  --------------------------------------------------------------------------------                                EP -                              PRIMARY      [facility]  Last Visit: 06-      CARE ([facility])   MED[facility]       [facility]                              FOLLOWUP/OF  [facility]  Next Visit: 09-      [facility]ICE VISIT   MEDICINE       Leilani Pat                                                            Last  Test          Frequency    Reason                     Performed    Due Date  --------------------------------------------------------------------------------    CBC.........  12 months..  ibuprofen................  12- 12-    [employer] Embedded Care Due Messages. Reference number: [id number].   7/17/2024 12:09:22 PM CDT

## 2024-07-22 ENCOUNTER — CLINICAL SUPPORT (OUTPATIENT)
Dept: REHABILITATION | Facility: HOSPITAL | Age: 39
End: 2024-07-22
Payer: OTHER GOVERNMENT

## 2024-07-22 ENCOUNTER — HOSPITAL ENCOUNTER (OUTPATIENT)
Dept: RADIOLOGY | Facility: HOSPITAL | Age: 39
Discharge: HOME | End: 2024-07-22
Attending: FAMILY MEDICINE
Payer: OTHER GOVERNMENT

## 2024-07-22 VITALS — WEIGHT: 214 LBS | HEIGHT: 66 IN | BODY MASS INDEX: 34.39 KG/M2

## 2024-07-22 DIAGNOSIS — M25.511 ACUTE PAIN OF RIGHT SHOULDER: Primary | ICD-10-CM

## 2024-07-22 DIAGNOSIS — R29.898 IMPAIRED STRENGTH OF SHOULDER MUSCLES: ICD-10-CM

## 2024-07-22 DIAGNOSIS — M25.311 SHOULDER INSTABILITY, RIGHT: ICD-10-CM

## 2024-07-22 DIAGNOSIS — Z12.31 OTHER SCREENING MAMMOGRAM: ICD-10-CM

## 2024-07-22 PROCEDURE — 97110 THERAPEUTIC EXERCISES: CPT | Mod: PN

## 2024-07-22 PROCEDURE — 97035 APP MDLTY 1+ULTRASOUND EA 15: CPT | Mod: PN

## 2024-07-22 PROCEDURE — 77067 SCR MAMMO BI INCL CAD: CPT | Mod: 26,,, | Performed by: RADIOLOGY

## 2024-07-22 PROCEDURE — 77063 BREAST TOMOSYNTHESIS BI: CPT | Mod: 26,,, | Performed by: RADIOLOGY

## 2024-07-22 PROCEDURE — 77067 SCR MAMMO BI INCL CAD: CPT | Mod: TC

## 2024-07-22 NOTE — PROGRESS NOTES
"  [facility]  Occupational Therapy Treatment Note    Date: 7/22/2024  Name: Heaven Dennis  Clinic Number: [id number]    Therapy Diagnosis:   Encounter Diagnoses   Name Primary?    Acute pain of right shoulder Yes    Shoulder instability, right     Impaired strength of shoulder muscles        Physician: Leilani Pat MD    Physician Orders: OT Evaluate and treat  Medical Diagnosis: Shoulder pain  Surgical Procedure and Date: NA  Evaluation Date: 7/3/2024  Insurance Authorization Period Expiration: 6/21/2024 - 10/19/2025  Plan of Care Certification Period: 6/25/2024 - 8/25/2025  Progress Note Due: 7/25/2024   Date of Return to MD: Unknown     Visit # / Visits authorized:   3   /  15    Precautions:  none    Time In: 10:15  Time Out: 11:00  Total Billable Time: 42 minutes    SUBJECTIVE     Pt reports: "It feels pretty alright today."  She was compliant with home exercise program given last session.     Response to previous treatment:n/a      Pain:   0/ 10 at rest    5 /10 with functional use  Location: right shoulder      OBJECTIVE    Measurements updated at progress report unless specified.    Treatment     Heaven received the treatments listed below:     Pt received the following ultrasound modalities after being cleared for contraindication for 10 minutes (with setup):  Ultrasound  3.3 MHz with 1.0 Wcm2 100%dutycycle  x 8 minutes with prep  (R) shoulder region to anterior medial region to  promote improved muscle circulation, elasticity, relaxation, and reduce spasm as well as post session inflammation.    Therapeutic exercises to develop strength, endurance, ROM, flexibility, posture, and core stabilization for 22 min with rest breaks to include:  ** frequent rest breaks given**  - Wall ball stabilization: 2 x 1 minute each clockwise and counter clockwise  - Standing serratus punches: 3 x 10  - Prone Row: 3 x 10 3#  - Prone YTW: 2 x 5  - external rotation with green band: 3 x 10 " wall supported  - Wall pushups: 3 x 10  - Supine proximal stabilization 2 x 2 minutes green band     Cold Pack - Applied to right shoulder to reduce post session inflammation and pain following therapy for 10 minutes with concurrent gripping exercises including:   - Blue digiflex composite: 50 reps   - Blue digiflex individual: 30 reps   - Blue weighted clip tripod and lateral: 30 reps     Patient Education and Home Exercises      Education provided:   - HEP of posterior capsule stability and strength  - Progress towards goals     Written Home Exercises Provided: yes.  Exercises were reviewed and Heaven was able to demonstrate them prior to the end of the session.  Heaven demonstrated good  understanding of the HEP provided. See EMR under Patient Instructions for exercises provided during therapy sessions.       Assessment     Heaven is progressing well towards her goals and there are no updates to goals at this time. Pt prognosis is Good. Pt will continue to benefit from skilled outpatient occupational therapy to address the deficits listed in the problem list on initial evaluation provide pt/family education and to maximize pt's level of independence in the home and community environment. Pt's spiritual, cultural and educational needs considered and pt agreeable to plan of care and goals.    Anticipated barriers to occupational therapy: none      Goals:   The following goals were discussed with the patient and patient is in agreement with them as to be addressed in the treatment plan.      Short Term Goals (STGs); to be met within 4 weeks.  STG #1: Pt will report 6 out of 10 pain level at worst in right upper extremity.  STG #2: Pt will increase shoulder MMT to 4+/5 in order to improve functional use of UE.  STG #4: Pt will demonstrate independence with issued HEP.      Long Term Goals (LTGs); to be met by discharge.  LTG #1: Pt will report a pain level of 3 out of 10 at worst in right upper extremity.    LTG #2: Pt will demo improved FOTO score to predicted level.  LTG #3: Pt will return to prior level of function for ADL.   LTG #4: Pt will increase shoulder MMT to 5/5 in all planes for improved functional use of upper extremity.   PLAN   Plan of Care Certification: 7/3/2024 to 9/3/2024.      Outpatient Occupational Therapy 1 - 2 times weekly for 8 weeks to include the following interventions: Manual therapy/joint mobilizations, Modalities for pain management, US 3 mhz, Therapeutic exercises/activities., Strengthening, Edema Control, Electrical Modalities, and Joint Protection.       Herson Collins, OT

## 2024-07-24 ENCOUNTER — TELEPHONE (OUTPATIENT)
Dept: FAMILY MEDICINE | Facility: CLINIC | Age: 39
End: 2024-07-24
Payer: OTHER GOVERNMENT

## 2024-07-24 NOTE — TELEPHONE ENCOUNTER
Refaxed referral to [facility] as per patient's request. Sent [place] referral. Pending approval. Patient is scheduled for 8/28 with podiatry. Patient contacted and notified of fax and referral.

## 2024-08-02 ENCOUNTER — CLINICAL SUPPORT (OUTPATIENT)
Dept: REHABILITATION | Facility: HOSPITAL | Age: 39
End: 2024-08-02
Payer: OTHER GOVERNMENT

## 2024-08-02 DIAGNOSIS — M25.311 SHOULDER INSTABILITY, RIGHT: ICD-10-CM

## 2024-08-02 DIAGNOSIS — M25.511 ACUTE PAIN OF RIGHT SHOULDER: Primary | ICD-10-CM

## 2024-08-02 DIAGNOSIS — R29.898 WEAKNESS OF RIGHT SHOULDER: ICD-10-CM

## 2024-09-03 ENCOUNTER — OFFICE VISIT (OUTPATIENT)
Dept: FAMILY MEDICINE | Facility: CLINIC | Age: 39
End: 2024-09-03
Payer: OTHER GOVERNMENT

## 2024-09-03 ENCOUNTER — APPOINTMENT (OUTPATIENT)
Dept: RADIOLOGY | Facility: CLINIC | Age: 39
End: 2024-09-03
Attending: FAMILY MEDICINE
Payer: OTHER GOVERNMENT

## 2024-09-03 VITALS
DIASTOLIC BLOOD PRESSURE: 76 MMHG | BODY MASS INDEX: 36 KG/M2 | WEIGHT: 224 LBS | HEART RATE: 76 BPM | HEIGHT: 66 IN | RESPIRATION RATE: 16 BRPM | SYSTOLIC BLOOD PRESSURE: 108 MMHG

## 2024-09-03 DIAGNOSIS — G89.29 CHRONIC RIGHT SHOULDER PAIN: ICD-10-CM

## 2024-09-03 DIAGNOSIS — M25.511 CHRONIC RIGHT SHOULDER PAIN: Primary | ICD-10-CM

## 2024-09-03 DIAGNOSIS — N39.45 CONTINUOUS LEAKAGE OF URINE: ICD-10-CM

## 2024-09-03 DIAGNOSIS — M25.511 CHRONIC RIGHT SHOULDER PAIN: ICD-10-CM

## 2024-09-03 DIAGNOSIS — H91.90 HEARING LOSS, UNSPECIFIED HEARING LOSS TYPE, UNSPECIFIED LATERALITY: ICD-10-CM

## 2024-09-03 DIAGNOSIS — G89.29 CHRONIC RIGHT SHOULDER PAIN: Primary | ICD-10-CM

## 2024-09-03 DIAGNOSIS — M54.50 CHRONIC MIDLINE LOW BACK PAIN WITHOUT SCIATICA: ICD-10-CM

## 2024-09-03 DIAGNOSIS — M72.2 PLANTAR FASCIITIS: ICD-10-CM

## 2024-09-03 DIAGNOSIS — G89.29 CHRONIC MIDLINE LOW BACK PAIN WITHOUT SCIATICA: ICD-10-CM

## 2024-09-03 PROCEDURE — 73030 X-RAY EXAM OF SHOULDER: CPT | Mod: 26,RT,, | Performed by: RADIOLOGY

## 2024-09-03 PROCEDURE — 99999 PR PBB SHADOW E&M-EST. PATIENT-LVL IV: CPT | Mod: PBBFAC,,, | Performed by: FAMILY MEDICINE

## 2024-09-03 PROCEDURE — 99214 OFFICE O/P EST MOD 30 MIN: CPT | Mod: S$PBB,,, | Performed by: FAMILY MEDICINE

## 2024-09-03 PROCEDURE — 99214 OFFICE O/P EST MOD 30 MIN: CPT | Mod: PBBFAC,25 | Performed by: FAMILY MEDICINE

## 2024-09-03 PROCEDURE — 73030 X-RAY EXAM OF SHOULDER: CPT | Mod: TC,PO,RT

## 2024-09-03 RX ORDER — IBUPROFEN 800 MG/1
800 TABLET ORAL 3 TIMES DAILY
Qty: 30 TABLET | Refills: 0 | Status: SHIPPED | OUTPATIENT
Start: 2024-09-03

## 2024-09-03 RX ORDER — TOLTERODINE 4 MG/1
4 CAPSULE, EXTENDED RELEASE ORAL DAILY
Qty: 30 CAPSULE | Refills: 11 | Status: SHIPPED | OUTPATIENT
Start: 2024-09-03 | End: 2025-09-03

## 2024-09-03 RX ORDER — ESCITALOPRAM OXALATE 10 MG/1
TABLET ORAL
COMMUNITY
Start: 2024-06-27

## 2024-09-03 NOTE — PROGRESS NOTES
Subjective:       Patient ID: Heaven Dennis is a 39 y.o. female.    Chief Complaint: Ear Fullness (Patient states she has always felt a pressure in her right ear, it just getting worse) and Back Pain (Patient states she is having pain in her back where it has gotten to where she cannot sit, stand or bend over for to long)    History of Present Illness  The patient presents for evaluation of multiple medical concerns.    She reports persistent foot pain despite receiving injections. She is due to be fitted for new orthotics.    She has continued to go to PT for her shoulder and her strength is improving. However, she has pain and clicking with overhead use and has worry for a recurrent labral tear.    She also mentions back issues, which she believes may be affecting her bladder, leading to increased urinary leakage. This has necessitated the use of a pad. She experiences leakage while walking and frequent urination but does not suspect a urinary tract infection. She maintains good hydration and often wakes up at night to use the bathroom. She recalls a difficult delivery of her daughter, who weighed 6 pounds and measured 21 inches at birth, which required an episiotomy. She has not consulted an OB-GYN since relocating and expresses a desire to avoid further surgeries.    She has been experiencing ear discomfort, which she initially attributed to allergies. However, she has not had any recent allergic reactions. She describes the sensation as pressure and likens it to being in a wind tunnel. She is interested in scheduling a hearing test.    She is uncertain about the cause of her hand pain, describing it as a throbbing sensation. She is unsure if it is a fracture or sprain. The only incident she recalls is hitting her hand against a door at work, but she did not think it was severe enough to cause injury. This occurred 3 to 4 days ago.    She continues to experience back pain and has exhausted her supply of  ibuprofen. She finds that the medication provides some relief but does not significantly improve her ability to bend over. 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 She has not been able to run due to her foot issue but has been performing stretches recommended by her physical therapist. Despite being physically active at work and home, she finds sitting uncomfortable. She also experiences spasms in her thigh after long car rides, typically lasting over 2 hours. She is currently undergoing physical therapy for her shoulder, not her back.    She had a labral tear in 2014 and had to have surgery. She has never had any rotator cuff issues.    Objective:      Physical Exam  Ears appear normal.      Physical Exam  Vitals and nursing note reviewed.   Constitutional:       General: She is not in acute distress.     Appearance: She is well-developed.   HENT:      Head: Normocephalic and atraumatic.   Eyes:      Conjunctiva/sclera: Conjunctivae normal.      Pupils: Pupils are equal, round, and reactive to light.   Neck:      Thyroid: No thyromegaly.   Cardiovascular:      Rate and Rhythm: Normal rate and regular rhythm.      Heart sounds: Normal heart sounds.   Pulmonary:      Effort: Pulmonary effort is normal. No respiratory distress.      Breath sounds: Normal breath sounds. No wheezing.   Abdominal:      General: Bowel sounds are normal.      Palpations: Abdomen is soft.      Tenderness: There is no abdominal tenderness.   Musculoskeletal:         General: No tenderness.      Cervical back: Normal range of motion and neck supple.      Comments: Limited extension to right shoulder.  No strength issues.   Negative impingement signs 1 and 2  5/5 supraspinatous   5/5 subscapularis  5/5 teres minor and infraspinatous       Lymphadenopathy:      Cervical: No cervical adenopathy.   Skin:     General: Skin is warm and dry.      Findings: No rash.   Neurological:       Mental Status: She is alert and oriented to person, place, and time.   Psychiatric:         Behavior: Behavior normal.         Results    Assessment:           1. Chronic right shoulder pain    2. Continuous leakage of urine    3. Hearing loss, unspecified hearing loss type, unspecified laterality    4. Plantar fasciitis    5. Chronic midline low back pain without sciatica        Plan:     Assessment & Plan  1. Urinary Incontinence.  Symptoms suggest a possible weakness in the pelvic floor muscles, contributing to urinary leakage. Pelvic floor physical therapy with Miguel was recommended to strengthen the pelvic muscles and manage urinary leakage. A prescription for Detrol was provided to alleviate bladder spasms. Her history seems multifactorial but NOT neurologic. A referral to OB-GYN was made for further evaluation and consideration of a pessary device.    2. Ear Discomfort.  A referral to audiology was made for a hearing test. Depending on the results, a consultation with an ENT specialist may be necessary.    3. Hand Pain.  The hand appears swollen, possibly due to a burst blood vessel, as indicated by a bluish discoloration. An x-ray is not deemed necessary at this time. Application of ice to the affected area was recommended to reduce swelling.    4. Back Pain.  The patient's symptoms suggest a possible labral tear, not a rotator cuff tear. The rotator cuff appears strong. A referral to physical therapy was made for the management of back pain. An x-ray of the shoulder was ordered, and an MRI will be scheduled. A prescription for ibuprofen was provided for pain management.        Heaven was seen today for ear fullness and back pain.    Diagnoses and all orders for this visit:    Chronic right shoulder pain  -     MRI Shoulder Without Contrast Right; Future  -     X-ray Shoulder 2 or More Views Right; Future    Continuous leakage of urine  -     Ambulatory referral/consult to Physical/Occupational Therapy;  Future    Hearing loss, unspecified hearing loss type, unspecified laterality  -     Ambulatory referral/consult to Audiology; Future    Plantar fasciitis    Chronic midline low back pain without sciatica  -     Ambulatory referral/consult to Physical/Occupational Therapy; Future    Other orders  -     ibuprofen (ADVIL,MOTRIN) 800 MG tablet; Take 1 tablet (800 mg total) by mouth 3 (three) times daily.          RTC if condition acutely worsens or any other concerns, otherwise RTC as scheduled      This note was generated with the assistance of ambient listening technology. Verbal consent was obtained by the patient and accompanying visitor(s) for the recording of patient appointment to facilitate this note. I attest to having reviewed and edited the generated note for accuracy, though some syntax or spelling errors may persist. Please contact the author of this note for any clarification.

## 2024-09-04 ENCOUNTER — TELEPHONE (OUTPATIENT)
Dept: FAMILY MEDICINE | Facility: CLINIC | Age: 39
End: 2024-09-04
Payer: OTHER GOVERNMENT

## 2024-09-04 NOTE — TELEPHONE ENCOUNTER
----- Message from Leilani Pat MD sent at 9/4/2024  8:03 AM CDT -----  Please notify patient that results have returned, and her bones are normal - go ahead w mri to look at labrum

## 2024-09-13 ENCOUNTER — HOSPITAL ENCOUNTER (OUTPATIENT)
Dept: RADIOLOGY | Facility: HOSPITAL | Age: 39
Discharge: HOME | End: 2024-09-13
Attending: FAMILY MEDICINE
Payer: OTHER GOVERNMENT

## 2024-09-13 ENCOUNTER — TELEPHONE (OUTPATIENT)
Dept: FAMILY MEDICINE | Facility: CLINIC | Age: 39
End: 2024-09-13
Payer: OTHER GOVERNMENT

## 2024-09-13 ENCOUNTER — PATIENT MESSAGE (OUTPATIENT)
Dept: RADIOLOGY | Facility: CLINIC | Age: 39
End: 2024-09-13
Payer: OTHER GOVERNMENT

## 2024-09-13 DIAGNOSIS — M25.511 CHRONIC RIGHT SHOULDER PAIN: ICD-10-CM

## 2024-09-13 DIAGNOSIS — G89.29 CHRONIC RIGHT SHOULDER PAIN: ICD-10-CM

## 2024-09-13 PROCEDURE — 73221 MRI JOINT UPR EXTREM W/O DYE: CPT | Mod: 26,RT,, | Performed by: RADIOLOGY

## 2024-09-13 PROCEDURE — 73221 MRI JOINT UPR EXTREM W/O DYE: CPT | Mod: TC,RT

## 2024-09-13 NOTE — PROGRESS NOTES
Partial tear to labrum and inflammation within the shoulder.  She can see ortho if she would like to, however, nsaids and pt are still recommended.  remy

## 2024-09-18 ENCOUNTER — TELEPHONE (OUTPATIENT)
Dept: FAMILY MEDICINE | Facility: CLINIC | Age: 39
End: 2024-09-18
Payer: OTHER GOVERNMENT

## 2024-09-18 ENCOUNTER — CLINICAL SUPPORT (OUTPATIENT)
Dept: REHABILITATION | Facility: HOSPITAL | Age: 39
End: 2024-09-18
Attending: FAMILY MEDICINE
Payer: OTHER GOVERNMENT

## 2024-09-18 DIAGNOSIS — G89.29 CHRONIC MIDLINE LOW BACK PAIN WITHOUT SCIATICA: ICD-10-CM

## 2024-09-18 DIAGNOSIS — M54.50 CHRONIC MIDLINE LOW BACK PAIN WITHOUT SCIATICA: ICD-10-CM

## 2024-09-18 PROCEDURE — 97112 NEUROMUSCULAR REEDUCATION: CPT | Mod: PN

## 2024-09-18 PROCEDURE — 97032 APPL MODALITY 1+ESTIM EA 15: CPT | Mod: PN

## 2024-09-18 PROCEDURE — 97161 PT EVAL LOW COMPLEX 20 MIN: CPT | Mod: PN

## 2024-09-18 NOTE — TELEPHONE ENCOUNTER
----- Message from Antony Zelaya sent at 2024 11:25 AM CDT -----  Contact: self  Heaven Dennis  MRN: [id number]  : 1985  PCP: Leilani Pat  Home Phone      [phone]  Work Phone      [phone]  Mobile          [phone]      MESSAGE:   Pt states physical therapy would not do her therapy for both back and pelvic.    [phone]

## 2024-09-18 NOTE — TELEPHONE ENCOUNTER
Patient called stating that [facility] will only allow PT/OT for one therapy back/neck/pelvic floor at a time. States she would like to proceed with shoulder PT at this time and asking if you can order MRI of her back instead of continuing with PT. She will see surgeon for pelvic floor repair.

## 2024-09-18 NOTE — PLAN OF CARE
[facility]   Physical Therapy Initial Evaluation     Date: 9/18/2024   Name: Heaven Dennis  Clinic Number: [id number]    Therapy Diagnosis:   Encounter Diagnosis   Name Primary?    Chronic midline low back pain without sciatica      Physician: Leilani Pat MD    Physician Orders: PT Eval and Treat   Medical Diagnosis from Referral: Diagnosis M54.50,G89.29 (ICD-10-CM) - Chronic midline low back pain without sciatica  Evaluation Date: 9/18/2024  Authorization Period Expiration: 12/28/2024  Plan of Care Expiration: 10/30/2024  Progress Note Due: visit 4  Visit # / Visits authorized: 1/ 1   FOTO: 1/3    Precautions: Standard     Time In: 0815  Time Out: 0855  Total Appointment Time (timed & untimed codes): 40 minutes      SUBJECTIVE     Date of onset: chronic low back pain    History of current condition - Heaven reports: she is in the [facility] [place] and has had chronic back pain for years. She does report increased shooting pain after sitting for prolonged periods in her right leg. She has not had any imaging of her lumbar spine at this time.     Falls: about two months ago fell down ladder    Imaging, :     Prior Therapy: OT for right shoulder  Social History:  lives with their family  Occupation: Active   Prior Level of Function: Independent without pain  Current Level of Function: Independent with pain and difficulty    Pain:  Current 4/10, worst 10/10, best 2/10   Location: right thigh and low back    Description: Aching, Dull, Sharp, and Shooting  Aggravating Factors: Sitting and Standing  Easing Factors: heating pad and ibuprophen    Patients goals: Patient to return to previous level of function without pain.     Medical History:   Past Medical History:   Diagnosis Date    PCOS (polycystic ovarian syndrome)        Surgical History:   Heaven Dennis  has a past surgical history that includes Fracture surgery and Cosmetic surgery.    Medications:   Heaven has a current  medication list which includes the following prescription(s): atomoxetine, escitalopram oxalate, ibuprofen, tolterodine, and trulicity.    Allergies:   Review of patient's allergies indicates:   Allergen Reactions    Penicillins Other (See Comments) and Anaphylaxis     unknown      Codeine Nausea And Vomiting          OBJECTIVE       Posture:  Slight FHP with increased lumbar lordosis    Lumbar Range of Motion:    Degrees Pain   Flexion 75%   +        Extension 75%   +        Left rotation   100% -        Right Rotation   100% -             Lower Extremity Strength  Right LE  Left LE    Knee extension: 4/5 Knee extension: 4/5   Knee flexion: 4-/5 Knee flexion: 4-/5   Hip flexion: 4/5 Hip flexion: 4/5   Ankle dorsiflexion: 4/5 Ankle dorsiflexion: 4/5       Neuro Dynamic Testing:    Sciatic nerve:      SLR: R = -     L = -       Femoral Nerve:    Femoral nerve test: -      Joint Mobility: hypermobile in lumbar spine    Palpation: TTP in L3-5 PVM     Sensation: Intact    Flexibility:      Popliteal Angle: R = 10 degrees ; L = 10 degrees         Limitation/Restriction for FOTO Lumbar Survey    Therapist reviewed FOTO scores for Heaven Dennis on 9/18/2024.   FOTO documents entered into [employer] - see Media section.    Limitation Score: 38%         TREATMENT     Total Treatment time (time-based codes) separate from Evaluation: 30 minutes      Heaven received the treatments listed below:      therapeutic exercises to develop core stabilization for 5 minutes including:  -TA with diaphragmatic breathing 1 x 10    manual therapy techniques: Soft tissue Mobilization were applied to the: lumbar spine for 10 minutes, including:  See EMR under MEDIA for written consent provided.    Palpation Assessment to determine the necessity for Functional Dry Needling  .     Heaven received the following manual therapy techniques:   FDN to B L4-5 PVM with e-stim 4-75 mm    neuromuscular re-education activities to improve:  for  minutes.  The following activities were included:      therapeutic activities to improve functional performance for   minutes, including:    hot pack for lumbar spine minutes to 5.    cold pack for  minutes to .      PATIENT EDUCATION AND HOME EXERCISES     Education provided:   - home exercise program for TA with diaphragmatic breathing    Written Home Exercises Provided: yes. Exercises were reviewed and Heaven was able to demonstrate them prior to the end of the session.  Heaven demonstrated good  understanding of the education provided. See EMR under Patient Instructions for exercises provided during therapy sessions.    ASSESSMENT     Heaven is a 39 y.o. female referred to outpatient Physical Therapy with a medical diagnosis of Diagnosis M54.50,G89.29 (ICD-10-CM) - Chronic midline low back pain without sciatica. Patient presents with decreased B hip strength, poor core stabilization and increased trigger points throughout lumbar spine. Patient would benefit from skilled physical therapy to address the above mentioned deficits.    Patient prognosis is Good.   Patient will benefit from skilled outpatient Physical Therapy to address the deficits stated above and in the chart below, provide patient /family education, and to maximize patientt's level of independence.     Plan of care discussed with patient: Yes  Patient's spiritual, cultural and educational needs considered and patient is agreeable to the plan of care and goals as stated below:     Anticipated Barriers for therapy: none    Medical Necessity is demonstrated by the following  History  Co-morbidities and personal factors that may impact the plan of care Co-morbidities:   none    Personal Factors:   no deficits     low   Examination  Body Structures and Functions, activity limitations and participation restrictions that may impact the plan of care Body Regions:   back    Body Systems:    gross symmetry  ROM  strength    Participation Restrictions:    none         low   Clinical Presentation stable and uncomplicated low   Decision Making/ Complexity Score: low     Goals:  Short Term Goals (2 Weeks):  1. Patient will be compliant with home exercise program to supplement therapy in promoting functional mobility.  2. Patient will perform sit to stand with good control to demonstrate improved core strength.  3. Patient will report no pain during thoracolumbar active range of motion to promote functional mobility.  Long Term Goals (4 Weeks):   1. Patient will improve FOTO score to </= 38% limited to decrease perceived limitation with maintaining/changing body position.   2. Patient will perform standing activities with good control to demonstrate improved core strength.  3. Patient will improve impaired B lower extremity manual muscle tests to >/=4+/5 to improve strength for functional tasks.  4. Patient will report no pain during prolonged positions to promote return to PLOF.        PLAN   Plan of care Certification: 9/18/2024 to 10/30/2024.    Outpatient Physical Therapy 1 times weekly for 4 weeks to include the following interventions: Electrical Stimulation  , Manual Therapy, Moist Heat/ Ice, Neuromuscular Re-ed, Patient Education, Therapeutic Activities, and Therapeutic Exercise.     Angela Hale, PT      I CERTIFY THE NEED FOR THESE SERVICES FURNISHED UNDER THIS PLAN OF TREATMENT AND WHILE UNDER MY CARE   Physician's comments:     Physician's Signature: ___________________________________________________

## 2024-09-25 ENCOUNTER — TELEPHONE (OUTPATIENT)
Dept: FAMILY MEDICINE | Facility: CLINIC | Age: 39
End: 2024-09-25
Payer: OTHER GOVERNMENT

## 2024-09-25 NOTE — TELEPHONE ENCOUNTER
----- Message from Leilani Pat MD sent at 2024  6:25 PM CDT -----  Contact: Tiffany  Can you call ortho [place] to see if they will see her  mh  ----- Message -----  From: Ceci Bowens LPN  Sent: 2024   1:15 PM CDT  To: Leilani Pat MD      ----- Message -----  From: Julio Pope  Sent: 2024   1:05 PM CDT  To: Adilia URIBE Staff    Heaven Sonny  MRN: [id number]  : 1985  PCP: Leilani Pat.  Home Phone      Not on file.  Work Phone      Not on file.  [employer]          [phone]      MESSAGE:     Tiffany with Dr. Desai's office called wanting to let nurse know they don't do  referrals anymore.          Please advise  Tiffany  [phone]

## 2024-10-01 ENCOUNTER — CLINICAL SUPPORT (OUTPATIENT)
Dept: REHABILITATION | Facility: HOSPITAL | Age: 39
End: 2024-10-01
Payer: OTHER GOVERNMENT

## 2024-10-01 DIAGNOSIS — R29.898 WEAKNESS OF RIGHT SHOULDER: ICD-10-CM

## 2024-10-01 DIAGNOSIS — M25.311 SHOULDER INSTABILITY, RIGHT: ICD-10-CM

## 2024-10-01 DIAGNOSIS — M25.511 ACUTE PAIN OF RIGHT SHOULDER: Primary | ICD-10-CM

## 2024-10-01 PROCEDURE — 97035 APP MDLTY 1+ULTRASOUND EA 15: CPT | Mod: PN

## 2024-10-01 PROCEDURE — 97110 THERAPEUTIC EXERCISES: CPT | Mod: PN
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
## 2024-10-02 ENCOUNTER — CLINICAL SUPPORT (OUTPATIENT)
Dept: REHABILITATION | Facility: HOSPITAL | Age: 39
End: 2024-10-02
Payer: OTHER GOVERNMENT

## 2024-10-02 DIAGNOSIS — G89.29 CHRONIC MIDLINE LOW BACK PAIN WITHOUT SCIATICA: Primary | ICD-10-CM

## 2024-10-02 DIAGNOSIS — M54.50 CHRONIC MIDLINE LOW BACK PAIN WITHOUT SCIATICA: Primary | ICD-10-CM

## 2024-10-02 PROCEDURE — 97140 MANUAL THERAPY 1/> REGIONS: CPT | Mod: PN

## 2024-10-02 PROCEDURE — 97110 THERAPEUTIC EXERCISES: CPT | Mod: PN

## 2024-10-02 PROCEDURE — 97032 APPL MODALITY 1+ESTIM EA 15: CPT | Mod: PN

## 2024-10-02 NOTE — PROGRESS NOTES
[facility]   Physical Therapy Treatment Note      Name: Heaven Dennis  Clinic Number: [id number]    Therapy Diagnosis:   Encounter Diagnosis   Name Primary?    Chronic midline low back pain without sciatica Yes     Physician: Leilani Pat MD    Visit Date: 10/2/2024    Physician Orders: PT Eval and Treat   Medical Diagnosis from Referral: Diagnosis M54.50,G89.29 (ICD-10-CM) - Chronic midline low back pain without sciatica  Evaluation Date: 9/18/2024  Authorization Period Expiration: 12/28/2024  Plan of Care Expiration: 10/30/2024  Progress Note Due: visit 4  Visit # / Visits authorized: 2/ 8   FOTO: 1/3     Precautions: Standard      Time In: 1345  Time Out: 1425  Total Appointment Time (timed & untimed codes): 40 minutes    PTA Visit #: -/5       Subjective     Patient reports: she was a little sore after her first treatment with FDN. She does have a PFT tomorrow which entails swimming, sit ups and push ups.  She was compliant with home exercise program.  Response to previous treatment: positive  Functional change: minimal    Pain: 4/10  Location: bilateral back      Objective      Objective Measures updated at progress report unless specified.     Treatment     Heaven received the treatments listed below:      therapeutic exercises to develop strength and core stabilization for 15 minutes including:  -1 x 10 TA with diaphragmatic breathing  -1 x 10 TA with march (increased pain after 10)  -2 x 10 BKTC with SB  -2 x 10 lower trunk rotations with SB  -1 x 10 bridging on SB  -2 x 10 red theraband hip abduction  -2 x 30 seconds piriformis stretch ea leg    manual therapy techniques: Soft tissue Mobilization were applied to the: low back for 10 minutes, including:  -FDN to right SI joint, 1 glute med 2- 75 mm with e-stim    neuromuscular re-education activities to improve:  for  minutes. The following activities were included:      therapeutic activities to improve functional  performance for   minutes, including:      gait training to improve functional mobility and safety for   minutes, including:      direct contact modalities after being cleared for contraindications:     supervised modalities after being cleared for contradictions:     hot pack for 5 minutes to lumbar spine in prone.    cold pack for  minutes to .    Patient Education and Home Exercises       Education provided:   - Cont HEP    Written Home Exercises Provided: Patient instructed to cont prior HEP. Exercises were reviewed and Heaven was able to demonstrate them prior to the end of the session.  Heaven demonstrated good  understanding of the education provided. See Electronic Medical Record under Patient Instructions for exercises provided during therapy sessions    Assessment     Patient tolerated today's treatment well with mild reports of pain. She had increased pain with activating TA with march. Patient would benefit from continued core stability there ex.     Heaven Is progressing well towards her goals.   Patient prognosis is Good.     Patient will continue to benefit from skilled outpatient physical therapy to address the deficits listed in the problem list box on initial evaluation, provide pt/family education and to maximize pt's level of independence in the home and community environment.     Patient's spiritual, cultural and educational needs considered and pt agreeable to plan of care and goals.     Anticipated barriers to physical therapy: none    Goals: Short Term Goals (2 Weeks):  1. Patient will be compliant with home exercise program to supplement therapy in promoting functional mobility.  2. Patient will perform sit to stand with good control to demonstrate improved core strength.  3. Patient will report no pain during thoracolumbar active range of motion to promote functional mobility.  Long Term Goals (4 Weeks):   1. Patient will improve FOTO score to </= 38% limited to decrease perceived  limitation with maintaining/changing body position.   2. Patient will perform standing activities with good control to demonstrate improved core strength.  3. Patient will improve impaired B lower extremity manual muscle tests to >/=4+/5 to improve strength for functional tasks.  4. Patient will report no pain during prolonged positions to promote return to PLOF.          PLAN   Plan of care Certification: 9/18/2024 to 10/30/2024.     Outpatient Physical Therapy 1 times weekly for 4 weeks to include the following interventions: Electrical Stimulation  , Manual Therapy, Moist Heat/ Ice, Neuromuscular Re-ed, Patient Education, Therapeutic Activities, and Therapeutic Exercise.       Angela Hale, PT

## 2024-10-03 ENCOUNTER — TELEPHONE (OUTPATIENT)
Dept: FAMILY MEDICINE | Facility: CLINIC | Age: 39
End: 2024-10-03
Payer: OTHER GOVERNMENT

## 2024-10-03 NOTE — TELEPHONE ENCOUNTER
----- Message from Julio sent at 10/2/2024  9:56 AM CDT -----  Contact: Celestejethro Dennis  MRN: [id number]  : 1985  PCP: Leilani Pat  Home Phone      [phone]  Work Phone      Not on file.  Mobile          [phone]      MESSAGE:     [facility] with [facility] called wanting to speak with nurse about needing a  referral sent over to them. Fax # [phone]          Please advise  Celeste  [phone]

## 2024-10-08 ENCOUNTER — OFFICE VISIT (OUTPATIENT)
Dept: SPORTS MEDICINE | Facility: CLINIC | Age: 39
End: 2024-10-08
Payer: OTHER GOVERNMENT

## 2024-10-08 ENCOUNTER — HOSPITAL ENCOUNTER (OUTPATIENT)
Dept: RADIOLOGY | Facility: HOSPITAL | Age: 39
Discharge: HOME | End: 2024-10-08
Attending: STUDENT IN AN ORGANIZED HEALTH CARE EDUCATION/TRAINING PROGRAM
Payer: OTHER GOVERNMENT

## 2024-10-08 VITALS
HEIGHT: 66 IN | WEIGHT: 224 LBS | HEART RATE: 81 BPM | BODY MASS INDEX: 36 KG/M2 | DIASTOLIC BLOOD PRESSURE: 80 MMHG | SYSTOLIC BLOOD PRESSURE: 116 MMHG

## 2024-10-08 DIAGNOSIS — M25.519 SHOULDER PAIN, UNSPECIFIED CHRONICITY, UNSPECIFIED LATERALITY: Primary | ICD-10-CM

## 2024-10-08 DIAGNOSIS — S43.431D TEAR OF RIGHT GLENOID LABRUM, SUBSEQUENT ENCOUNTER: ICD-10-CM

## 2024-10-08 DIAGNOSIS — M25.511 CHRONIC RIGHT SHOULDER PAIN: ICD-10-CM

## 2024-10-08 DIAGNOSIS — R20.2 PARESTHESIAS IN RIGHT HAND: ICD-10-CM

## 2024-10-08 DIAGNOSIS — M75.81 TENDINITIS OF RIGHT PECTORALIS MAJOR: Primary | ICD-10-CM

## 2024-10-08 DIAGNOSIS — G89.29 CHRONIC RIGHT SHOULDER PAIN: ICD-10-CM

## 2024-10-08 DIAGNOSIS — M25.519 SHOULDER PAIN, UNSPECIFIED CHRONICITY, UNSPECIFIED LATERALITY: ICD-10-CM

## 2024-10-08 PROCEDURE — 99499 UNLISTED E&M SERVICE: CPT | Mod: S$PBB,,, | Performed by: FAMILY MEDICINE

## 2024-10-08 PROCEDURE — 99214 OFFICE O/P EST MOD 30 MIN: CPT | Mod: PBBFAC,25 | Performed by: STUDENT IN AN ORGANIZED HEALTH CARE EDUCATION/TRAINING PROGRAM

## 2024-10-08 PROCEDURE — 76942 ECHO GUIDE FOR BIOPSY: CPT | Mod: PBBFAC | Performed by: FAMILY MEDICINE

## 2024-10-08 PROCEDURE — 20551 NJX 1 TENDON ORIGIN/INSJ: CPT | Mod: S$PBB,,, | Performed by: FAMILY MEDICINE

## 2024-10-08 PROCEDURE — 73030 X-RAY EXAM OF SHOULDER: CPT | Mod: TC,RT

## 2024-10-08 PROCEDURE — 99999 PR PBB SHADOW E&M-EST. PATIENT-LVL IV: CPT | Mod: PBBFAC,,, | Performed by: STUDENT IN AN ORGANIZED HEALTH CARE EDUCATION/TRAINING PROGRAM

## 2024-10-08 PROCEDURE — 99999 PR PBB SHADOW E&M-EST. PATIENT-LVL I: CPT | Mod: PBBFAC,,, | Performed by: FAMILY MEDICINE

## 2024-10-08 PROCEDURE — 99999PBSHW PR PBB SHADOW TECHNICAL ONLY FILED TO HB: Mod: PBBFAC,,,

## 2024-10-08 PROCEDURE — 99211 OFF/OP EST MAY X REQ PHY/QHP: CPT | Mod: PBBFAC,25,27 | Performed by: FAMILY MEDICINE

## 2024-10-08 PROCEDURE — 73030 X-RAY EXAM OF SHOULDER: CPT | Mod: 26,RT,, | Performed by: RADIOLOGY

## 2024-10-08 RX ORDER — TRIAMCINOLONE ACETONIDE 40 MG/ML
40 INJECTION, SUSPENSION INTRA-ARTICULAR; INTRAMUSCULAR
Status: DISCONTINUED | OUTPATIENT
Start: 2024-10-08 | End: 2024-10-08 | Stop reason: HOSPADM

## 2024-10-08 RX ADMIN — TRIAMCINOLONE ACETONIDE 40 MG: 40 INJECTION, SUSPENSION INTRA-ARTICULAR; INTRAMUSCULAR at 11:10

## 2024-10-08 NOTE — TELEPHONE ENCOUNTER
Care Due:                  Date            Visit Type   Department     Provider  --------------------------------------------------------------------------------                                MYCHART                              FOLLOWUP/OF  [facility]  Last Visit: 09-      FICE VISIT   MEDICINE       Leilani Pat                              MYCHART                              FOLLOWUP/OF  [facility]  Next Visit: 12-      FICE VISIT   [facility]       Leilani Pat                                                            Last  Test          Frequency    Reason                     Performed    Due Date  --------------------------------------------------------------------------------    CBC.........  12 months..  ibuprofen................  12- 12-    HBA1C.......  6 months...  TRULICITY................  05-   10-    [facility] Embedded Care Due Messages. Reference number: [id number].   10/08/2024 3:26:06 PM CDT

## 2024-10-08 NOTE — PROGRESS NOTES
Subjective:          Chief Complaint: Heaven Dennis is a 39 y.o. female who had concerns including Pain of the Right Shoulder.    CC:  right Shoulder Pain    HPI:    Heaven Dennis is a 39 y.o. female right-hand dominant  in the [employer] presents for evaluation of her right shoulder.  The pain has been present for several months after a fall where she went to grab on with her right arm.  She was a pain in the anterior and superior aspect of the shoulder since.  It increases with any movement of the shoulder mainly overhead, lifting, cross-body motion, climbing ladders, reaching behind her back, and pushing away.  This makes it difficult to do some of the tasks she was required to at work, mainly overhead.  She rates her pain 3/10 sitting here right now, however it increases to 9/10 at its worst.  Overall, she was about 50% of baseline shoulder function with pain that does keep her up at night.      Of note, in 2014 she underwent right shoulder arthroscopy with labral repair, likely SLAP repair, by an outside surgeon in [facility].  Additionally, she had right carpal tunnel surgery a proximally 4 years ago in [place].      Dominant Hand: Right    Occupation:   in the [employer]    SSV: 50%    Night Pain? Yes    Interfere with ADLs? Yes   Past Medical History:   Diagnosis Date    PCOS (polycystic ovarian syndrome)        Current Outpatient Medications on File Prior to Visit   Medication Sig Dispense Refill    EScitalopram oxalate (LEXAPRO) 10 MG tablet Take by mouth.      ibuprofen (ADVIL,MOTRIN) 800 MG tablet Take 1 tablet (800 mg total) by mouth 3 (three) times daily. 30 tablet 0    tolterodine (DETROL LA) 4 MG 24 hr capsule Take 1 capsule (4 mg total) by mouth once daily. 30 capsule 11    TRULICITY 0.75 mg/0.5 mL pen injector INJECT 0.75 MG INTO THE SKIN EVERY 7 DAYS. 12 Pen 1    atomoxetine (STRATTERA) 25 MG capsule TAKE 1 CAPSULE BY MOUTH ONCE DAILY. (Patient not taking:  Reported on 10/8/2024) 90 capsule 1     No current facility-administered medications on file prior to visit.       Past Surgical History:   Procedure Laterality Date    COSMETIC SURGERY      FRACTURE SURGERY         Family History   Problem Relation Name Age of Onset    Cancer Mother      Diabetes Mother      Hypertension Father Cameron belcher     Diabetes Father Cameron belcher     Hearing loss Father Cameron belcher     Cancer Maternal Uncle Otf belcher     Miscarriages / Stillbirths Maternal Aunt Tasha krishnamurthy        Social History     Socioeconomic History    Marital status: Single   Tobacco Use    Smoking status: Former     Types: Vaping w/o nicotine     Quit date: 2023     Years since quittin.8    Smokeless tobacco: Never   Substance and Sexual Activity    Alcohol use: Not Currently    Drug use: Never    Sexual activity: Not Currently     Social Drivers of Health     Financial Resource Strain: Low Risk  (2024)    Overall Financial Resource Strain (CARDIA)     Difficulty of Paying Living Expenses: Not very hard   Food Insecurity: No Food Insecurity (2024)    Hunger Vital Sign     Worried About Running Out of Food in the Last Year: Never true     Ran Out of Food in the Last Year: Never true   Transportation Needs: No Transportation Needs (2024)    PRAPARE - Transportation     Lack of Transportation (Medical): No     Lack of Transportation (Non-Medical): No   Physical Activity: Insufficiently Active (2024)    Exercise Vital Sign     Days of Exercise per Week: 3 days     Minutes of Exercise per Session: 30 min   Stress: Stress Concern Present (2024)    [employer] - Occupational Stress Questionnaire     Feeling of Stress : Very much   Housing Stability: Unknown (2022)    Housing Stability Vital Sign     Unable to Pay for Housing in the Last Year: No     Unstable Housing in the Last Year: No       Review of Systems   Constitutional:  Negative.   HENT: Negative.     Eyes: Negative.    Cardiovascular: Negative.    Respiratory: Negative.     Endocrine: Negative.    Hematologic/Lymphatic: Negative.    Skin: Negative.    Musculoskeletal:  Positive for joint pain and muscle weakness.   Neurological: Negative.    Psychiatric/Behavioral: Negative.     Allergic/Immunologic: Negative.      Pain Related Questions  Over the past 3 days, what was your average pain during activity? (I.e. running, jogging, walking, climbing stairs, getting dressed, ect.): 4  Over the past 3 days, what was your highest pain level?: 5  Over the past 3 days, what was your lowest pain level? : 4    Other  How many nights a week are you awakened by your affected body part?: 0  Was the patient's HEIGHT measured or patient reported?: Patient Reported  Was the patient's WEIGHT measured or patient reported?: Measured      Objective:        General: Heaven is well-developed, well-nourished, appears stated age, in no acute distress, alert and oriented to time, place and person.     General    Nursing note and vitals reviewed.  Constitutional: She is oriented to person, place, and time. She appears well-developed and well-nourished. No distress.   HENT:   Head: Normocephalic and atraumatic.   Nose: Nose normal.   Eyes: EOM are normal.   Cardiovascular:  Intact distal pulses.            Pulmonary/Chest: Effort normal. No respiratory distress.   Neurological: She is alert and oriented to person, place, and time.   Psychiatric: She has a normal mood and affect. Her behavior is normal. Judgment and thought content normal.         Right Shoulder Exam     Inspection/Observation   Swelling: absent  Bruising: absent  Scars: present (Previous arthroscopic surgical incisions are well healed)  Deformity: absent  Scapular Winging: absent  Scapular Dyskinesia: positive  Atrophy: absent    Tenderness   The patient is tender to palpation of the acromioclavicular joint and biceps tendon.    Range of  Motion   Active abduction:  160   Passive abduction:  170   Forward Flexion:  170   Forward Elevation: 170  External Rotation 0 degrees:  50   Internal rotation 0 degrees:  Lumbar     Tests & Signs   Apprehension: negative  Cross arm: negative  Shahid test: positive  Impingement: positive  Sulcus: absent  Rotator Cuff Painful Arc/Range: moderate  Lift Off Sign: negative  Belly Press: negative  Active Compression Test ([place]'s Sign): positive  Speed's Test: positive  Anterior Drawer Test: 0   Posterior Drawer Test: 0  Relocation 90 degrees: negative  Relocation > 90 degrees: negative  Bear Hug: negative  Jerk Test: postive    Other   Sensation: normal    Left Shoulder Exam     Inspection/Observation   Swelling: absent  Bruising: absent  Scars: absent  Deformity: absent  Scapular Winging: absent  Scapular Dyskinesia: negative  Atrophy: absent    Tenderness   The patient is experiencing no tenderness.     Range of Motion   Active abduction:  170   Passive abduction:  170   Forward Flexion:  180   Forward Elevation: 180  External Rotation 0 degrees:  60   Internal rotation 0 degrees:  Mid thoracic     Tests & Signs   Apprehension: negative  Sulcus: absent  Anterior Drawer Test: 0  Posterior Drawer Test: 0  Relocation 90 degrees: negative  Relocation > 90 degrees: negative  Jerk Test: negative    Other   Sensation: normal       Muscle Strength   Right Upper Extremity   Shoulder Abduction: 4/5   Shoulder Internal Rotation: 5/5   Shoulder External Rotation: 5/5   Supraspinatus: 5/5   Subscapularis: 5/5   Biceps: 5/5   Left Upper Extremity  Shoulder Abduction: 5/5   Shoulder Internal Rotation: 5/5   Shoulder External Rotation: 5/5   Supraspinatus: 5/5   Subscapularis: 5/5   Biceps: 5/5     Vascular Exam     Right Pulses      Radial:                    2+      Left Pulses      Radial:                    2+      Capillary Refill  Right Hand: normal capillary refill  Left Hand: normal capillary refill        Imaging:    X-rays right shoulder from 10/08/2024 personally viewed by me on that day.  These include AP, Grashey, scapular Y, axillary.  Glenohumeral joint is reduced.  No fracture.  No bony abnormality other than arthritic changes in the acromioclavicular joint.      MRI of the right shoulder from 09/13/2024 personally viewed by me 10/08/2024.  Evidence of prior SLAP repair with cystic formation in the glenoid.  Possible recurrent SLAP tear with posterior extension.  Small anterior labral tear.  Cartilage maintained in the glenohumeral joint.      Assessment:     Heaven Dennis is a 39 y.o. female with right shoulder pain consistent with recurrent labral tear as well as possible thoracic outlet syndrome versus cervical etiology.  Encounter Diagnoses   Name Primary?    Shoulder pain, unspecified chronicity, unspecified laterality Yes    Tear of right glenoid labrum, subsequent encounter     Paresthesias in right hand           Plan:       The diagnosis treatment options were discussed with the patient all her questions were answered I showed her the x-rays and the MRI and reviewed the findings with her.  We discussed treatment options.  We will obtain an EMG to evaluate the etiology for paresthesias in the bilateral upper extremities, the this should distinguish between cervical radiculopathy, recurrent carpal tunnel syndrome, or other etiologies.  Additionally, I will have her see Dr. Holland today for ultrasound-guided injection deep of the pectoralis minor for possible thoracic outlet syndrome.  Return to clinic after the EMG.

## 2024-10-08 NOTE — PROCEDURES
"Tendon Origin    Date/Time: 10/8/2024 11:45 AM    Performed by: Noam Holland MD  Authorized by: Noam Holland MD    Consent Done?:  Yes (Verbal)  Timeout: prior to procedure the correct patient, procedure, and site was verified    Indications:  Diagnostic evaluation  Site marked: the procedure site was marked    Timeout: prior to procedure the correct patient, procedure, and site was verified    Prep: patient was prepped and draped in usual sterile fashion    Ultrasonic Guidance for Needle Placement?: Yes    Needle size:  22 G  Approach:  Anterolateral  Medications:  40 mg triamcinolone acetonide 40 mg/mL  Patient tolerance:  Patient tolerated the procedure well with no immediate complications   Diagnostic injection of local anesthetic AND CSI about the insertion of pectoralis minor upon the coracoid process of right shoulder.    The acromial branch of the thoracoacromial artery and the corresponding cephalic vein   were identified as they coursed near the coracoid process, and avoided entirely thorough   the course of this procedure.      Description of ultrasound utilization for needle guidance:   Ultrasound guidance used for needle localization. Images saved and stored for documentation. The INSERTIONAL FIBERS OF THE PECTORALIS MINOR UPON THE CORACOID PROCESS WERE visualized. THESE TENDON FIBERS WERE BATHED IN 5.0cc LOCAL ANESTHETIC + CSI. Dynamic visualization of the 22g x 3.5" needle was continuous throughout the procedure.     "

## 2024-10-09 ENCOUNTER — CLINICAL SUPPORT (OUTPATIENT)
Dept: REHABILITATION | Facility: HOSPITAL | Age: 39
End: 2024-10-09
Payer: OTHER GOVERNMENT

## 2024-10-09 DIAGNOSIS — M54.50 CHRONIC MIDLINE LOW BACK PAIN WITHOUT SCIATICA: Primary | ICD-10-CM

## 2024-10-09 DIAGNOSIS — G89.29 CHRONIC MIDLINE LOW BACK PAIN WITHOUT SCIATICA: Primary | ICD-10-CM

## 2024-10-09 PROCEDURE — 97032 APPL MODALITY 1+ESTIM EA 15: CPT | Mod: PN

## 2024-10-09 PROCEDURE — 97110 THERAPEUTIC EXERCISES: CPT | Mod: PN

## 2024-10-09 PROCEDURE — 97112 NEUROMUSCULAR REEDUCATION: CPT | Mod: PN

## 2024-10-09 RX ORDER — IBUPROFEN 800 MG/1
800 TABLET ORAL 3 TIMES DAILY
Qty: 30 TABLET | Refills: 0 | Status: SHIPPED | OUTPATIENT
Start: 2024-10-09

## 2024-10-09 NOTE — PROGRESS NOTES
[facility]   Physical Therapy Treatment Note      Name: Heaven Dennis  Clinic Number: [id number]    Therapy Diagnosis:   Encounter Diagnosis   Name Primary?    Chronic midline low back pain without sciatica Yes     Physician: Leilani Pat MD    Visit Date: 10/9/2024    Physician Orders: PT Eval and Treat   Medical Diagnosis from Referral: Diagnosis M54.50,G89.29 (ICD-10-CM) - Chronic midline low back pain without sciatica  Evaluation Date: 9/18/2024  Authorization Period Expiration: 12/28/2024  Plan of Care Expiration: 10/30/2024  Progress Note Due: visit 4  Visit # / Visits authorized: 3/ 8   FOTO: 1/3     Precautions: Standard      Time In: 0730  Time Out: 0810  Total Appointment Time (timed & untimed codes): 40 minutes    PTA Visit #: -/5       Subjective     Patient reports: she was a little sore after her first treatment with FDN. She does have a PFT tomorrow which entails swimming, sit ups and push ups.  She was compliant with home exercise program.  Response to previous treatment: positive  Functional change: minimal    Pain: 4/10  Location: bilateral back      Objective      Objective Measures updated at progress report unless specified.     Treatment     Heaven received the treatments listed below:      therapeutic exercises to develop strength and core stabilization for 15 minutes including:  -1 x 10 TA with diaphragmatic breathing  -1 x 10 TA with march (increased pain after 10)  -2 x 10 BKTC with SB  -2 x 10 lower trunk rotations with SB  -1 x 10 bridging on SB  -2 x 10 red theraband hip abduction  -2 x 30 seconds piriformis stretch ea leg    manual therapy techniques: Soft tissue Mobilization were applied to the: low back for 10 minutes, including:  -FDN to left SI joint, 1 glute med 2- 75 mm with e-stim    neuromuscular re-education activities to improve:  for  minutes. The following activities were included:      therapeutic activities to improve functional  performance for   minutes, including:      gait training to improve functional mobility and safety for   minutes, including:      direct contact modalities after being cleared for contraindications:     supervised modalities after being cleared for contradictions:     hot pack for 5 minutes to lumbar spine in prone.    cold pack for  minutes to .    Patient Education and Home Exercises       Education provided:   - Cont HEP    Written Home Exercises Provided: Patient instructed to cont prior HEP. Exercises were reviewed and Heaven was able to demonstrate them prior to the end of the session.  Heaven demonstrated good  understanding of the education provided. See Electronic Medical Record under Patient Instructions for exercises provided during therapy sessions    Assessment     Patient tolerated today's treatment well with mild reports of pain. She required VC for there ex. Patient would benefit from continued core stability there ex.     Heaven Is progressing well towards her goals.   Patient prognosis is Good.     Patient will continue to benefit from skilled outpatient physical therapy to address the deficits listed in the problem list box on initial evaluation, provide pt/family education and to maximize pt's level of independence in the home and community environment.     Patient's spiritual, cultural and educational needs considered and pt agreeable to plan of care and goals.     Anticipated barriers to physical therapy: none    Goals: Short Term Goals (2 Weeks):  1. Patient will be compliant with home exercise program to supplement therapy in promoting functional mobility.  2. Patient will perform sit to stand with good control to demonstrate improved core strength.  3. Patient will report no pain during thoracolumbar active range of motion to promote functional mobility.  Long Term Goals (4 Weeks):   1. Patient will improve FOTO score to </= 38% limited to decrease perceived limitation with  maintaining/changing body position.   2. Patient will perform standing activities with good control to demonstrate improved core strength.  3. Patient will improve impaired B lower extremity manual muscle tests to >/=4+/5 to improve strength for functional tasks.  4. Patient will report no pain during prolonged positions to promote return to PLOF.          PLAN   Plan of care Certification: 9/18/2024 to 10/30/2024.     Outpatient Physical Therapy 1 times weekly for 4 weeks to include the following interventions: Electrical Stimulation  , Manual Therapy, Moist Heat/ Ice, Neuromuscular Re-ed, Patient Education, Therapeutic Activities, and Therapeutic Exercise.       Angela Hale, PT

## 2024-10-10 ENCOUNTER — OFFICE VISIT (OUTPATIENT)
Dept: OBSTETRICS AND GYNECOLOGY | Facility: CLINIC | Age: 39
End: 2024-10-10
Payer: OTHER GOVERNMENT

## 2024-10-10 VITALS
BODY MASS INDEX: 36.56 KG/M2 | SYSTOLIC BLOOD PRESSURE: 124 MMHG | HEIGHT: 66 IN | HEART RATE: 103 BPM | DIASTOLIC BLOOD PRESSURE: 78 MMHG | WEIGHT: 227.5 LBS

## 2024-10-10 DIAGNOSIS — Z46.89 ENCOUNTER FOR FITTING AND ADJUSTMENT OF PESSARY: ICD-10-CM

## 2024-10-10 DIAGNOSIS — N39.3 SUI (STRESS URINARY INCONTINENCE, FEMALE): Primary | ICD-10-CM

## 2024-10-10 DIAGNOSIS — N39.45 CONTINUOUS LEAKAGE OF URINE: ICD-10-CM

## 2024-10-10 PROCEDURE — 99213 OFFICE O/P EST LOW 20 MIN: CPT | Mod: PBBFAC | Performed by: OBSTETRICS & GYNECOLOGY

## 2024-10-10 PROCEDURE — 99999PBSHW PR PBB SHADOW TECHNICAL ONLY FILED TO HB: Mod: PBBFAC,,,

## 2024-10-10 PROCEDURE — 99999 PR PBB SHADOW E&M-EST. PATIENT-LVL III: CPT | Mod: PBBFAC,,, | Performed by: OBSTETRICS & GYNECOLOGY

## 2024-10-10 PROCEDURE — A4562 PESSARY, NON RUBBER,ANY TYPE: HCPCS | Mod: PBBFAC | Performed by: OBSTETRICS & GYNECOLOGY

## 2024-10-10 PROCEDURE — 99203 OFFICE O/P NEW LOW 30 MIN: CPT | Mod: S$PBB,,, | Performed by: OBSTETRICS & GYNECOLOGY

## 2024-10-10 NOTE — PROGRESS NOTES
Subjective:    Patient ID: Heaven Dennis is a 39 y.o. y.o. female.     Chief Complaint:   Chief Complaint   Patient presents with    Urinary Incontinence       History of Present Illness:  Heaven presents today complaining of incontinence. She reports she has some mild urge incontinence in the morning but her most bothersome symptom is her stress incontinence. She has a pending referral to pelvic floor therapy but is currently having PT on her shoulder so will have to wait until it is done. We discussed different management options for the interim and she would like to proceed with a pessary fitting.       ROS:   Review of Systems   Constitutional:  Negative for activity change, appetite change, chills, diaphoresis, fatigue, fever and unexpected weight change.   HENT:  Negative for mouth sores and tinnitus.    Eyes:  Negative for discharge and visual disturbance.   Respiratory:  Negative for cough, shortness of breath and wheezing.    Cardiovascular:  Negative for chest pain, palpitations and leg swelling.   Gastrointestinal:  Negative for abdominal pain, bloating, blood in stool, constipation, diarrhea, nausea and vomiting.   Endocrine: Negative for diabetes, hair loss, hot flashes, hyperthyroidism and hypothyroidism.   Genitourinary:  Positive for urinary incontinence. Negative for dysuria, flank pain, frequency, genital sores, hematuria, urgency, vaginal bleeding, vaginal discharge, vaginal pain, postcoital bleeding and vaginal odor.   Musculoskeletal:  Negative for back pain, joint swelling and myalgias.   Integumentary:  Negative for rash, acne, breast mass, nipple discharge and breast skin changes.   Neurological:  Negative for seizures, syncope, numbness and headaches.   Hematological:  Negative for adenopathy. Does not bruise/bleed easily.   Psychiatric/Behavioral:  Negative for depression and sleep disturbance. The patient is not nervous/anxious.    Breast: Negative for mass, mastodynia, nipple  discharge and skin changes          Objective:    Vital Signs:  Vitals:    10/10/24 1515   BP: 124/78   Pulse: 103       Physical Exam:  General:  alert, cooperative, no distress   Head Normocephalic, without obvious abnormality, atraumatic   Neck .supple, symmetrical, trachea midline   Skin:  Skin color, texture, turgor normal. No rashes or lesions   Abdomen:  soft, non-tender; bowel sounds normal   Pelvis: External genitalia: normal general appearance  Urinary system: urethral meatus normal, bladder nontender  Vaginal: normal mucosa without prolapse or lesions  Cervix: normal appearance  Uterus: normal size, shape, position  Adnexa: normal size, nontender bilaterally   Extremities extremities normal, atraumatic, no cyanosis or edema   Neurologic Grossly normal          Problem List Items Addressed This Visit    None  Visit Diagnoses       DUONG (stress urinary incontinence, female)    -  Primary    Continuous leakage of urine        Encounter for fitting and adjustment of pessary              A #2 ring with support/knob was placed without difficulty  RTC in 2 weeks

## 2024-10-16 ENCOUNTER — OFFICE VISIT (OUTPATIENT)
Dept: FAMILY MEDICINE | Facility: CLINIC | Age: 39
End: 2024-10-16
Payer: OTHER GOVERNMENT

## 2024-10-16 ENCOUNTER — CLINICAL SUPPORT (OUTPATIENT)
Dept: REHABILITATION | Facility: HOSPITAL | Age: 39
End: 2024-10-16
Payer: OTHER GOVERNMENT

## 2024-10-16 VITALS
HEART RATE: 76 BPM | WEIGHT: 228.94 LBS | DIASTOLIC BLOOD PRESSURE: 82 MMHG | BODY MASS INDEX: 36.79 KG/M2 | RESPIRATION RATE: 16 BRPM | HEIGHT: 66 IN | SYSTOLIC BLOOD PRESSURE: 118 MMHG

## 2024-10-16 DIAGNOSIS — J32.4 CHRONIC PANSINUSITIS: ICD-10-CM

## 2024-10-16 DIAGNOSIS — H92.01 ACUTE OTALGIA, RIGHT: ICD-10-CM

## 2024-10-16 DIAGNOSIS — H70.90 MASTOIDITIS, UNSPECIFIED LATERALITY: Primary | ICD-10-CM

## 2024-10-16 DIAGNOSIS — M54.50 CHRONIC MIDLINE LOW BACK PAIN WITHOUT SCIATICA: Primary | ICD-10-CM

## 2024-10-16 DIAGNOSIS — G89.29 CHRONIC MIDLINE LOW BACK PAIN WITHOUT SCIATICA: Primary | ICD-10-CM

## 2024-10-16 PROCEDURE — 97032 APPL MODALITY 1+ESTIM EA 15: CPT | Mod: PN

## 2024-10-16 PROCEDURE — 99213 OFFICE O/P EST LOW 20 MIN: CPT | Mod: PBBFAC | Performed by: FAMILY MEDICINE

## 2024-10-16 PROCEDURE — 97112 NEUROMUSCULAR REEDUCATION: CPT | Mod: PN

## 2024-10-16 PROCEDURE — 99214 OFFICE O/P EST MOD 30 MIN: CPT | Mod: S$PBB,,, | Performed by: FAMILY MEDICINE

## 2024-10-16 PROCEDURE — 99999 PR PBB SHADOW E&M-EST. PATIENT-LVL III: CPT | Mod: PBBFAC,,, | Performed by: FAMILY MEDICINE

## 2024-10-16 RX ORDER — ESCITALOPRAM OXALATE 20 MG/1
20 TABLET ORAL
COMMUNITY
Start: 2024-10-15

## 2024-10-16 RX ORDER — TRAZODONE HYDROCHLORIDE 50 MG/1
50 TABLET ORAL NIGHTLY
COMMUNITY
Start: 2024-10-15

## 2024-10-16 RX ORDER — PREDNISONE 10 MG/1
10 TABLET ORAL DAILY
Qty: 7 TABLET | Refills: 0 | Status: SHIPPED | OUTPATIENT
Start: 2024-10-16

## 2024-10-16 NOTE — PROGRESS NOTES
[facility]   Physical Therapy Treatment Note      Name: Heaven Dennis  Clinic Number: [id number]    Therapy Diagnosis:   Encounter Diagnosis   Name Primary?    Chronic midline low back pain without sciatica Yes     Physician: Leilani Pat MD    Visit Date: 10/16/2024    Physician Orders: PT Eval and Treat   Medical Diagnosis from Referral: Diagnosis M54.50,G89.29 (ICD-10-CM) - Chronic midline low back pain without sciatica  Evaluation Date: 9/18/2024  Authorization Period Expiration: 12/28/2024  Plan of Care Expiration: 10/30/2024  Progress Note Due: visit 4  Visit # / Visits authorized: 3/ 8   FOTO: 1/3     Precautions: Standard      Time In: 0730  Time Out: 0810  Total Appointment Time (timed & untimed codes): 40 minutes    PTA Visit #: -/5       Subjective     Patient reports: she is still having some lower back pain as well as mid-back pain.  She was compliant with home exercise program.  Response to previous treatment: positive  Functional change: minimal    Pain: 4/10  Location: bilateral back      Objective      Objective Measures updated at progress report unless specified.     Treatment     Heaven received the treatments listed below:      therapeutic exercises to develop strength and core stabilization for 15 minutes including:  -1 x 10 TA with diaphragmatic breathing  -1 x 10 TA with march (increased pain after 10)  -2 x 10 BKTC with SB  -2 x 10 lower trunk rotations with SB  -1 x 10 bridging on SB  -2 x 10 red theraband hip abduction  -2 x 30 seconds piriformis stretch ea leg    manual therapy techniques: Soft tissue Mobilization were applied to the: low back for 10 minutes, including:  -FDN to glute med 2- 75 mm with e-stim    neuromuscular re-education activities to improve:  for  minutes. The following activities were included:      therapeutic activities to improve functional performance for   minutes, including:      gait training to improve functional mobility  and safety for   minutes, including:      direct contact modalities after being cleared for contraindications:     supervised modalities after being cleared for contradictions:     hot pack for 5 minutes to lumbar spine in prone.    cold pack for  minutes to .    Patient Education and Home Exercises       Education provided:   - Cont HEP    Written Home Exercises Provided: Patient instructed to cont prior HEP. Exercises were reviewed and Heaven was able to demonstrate them prior to the end of the session.  Heaven demonstrated good  understanding of the education provided. See Electronic Medical Record under Patient Instructions for exercises provided during therapy sessions    Assessment     Patient with little progress with overall pain relief with therapy. Spoke to patient about returning to MD at this point for possible injections.    Heaven Is progressing well towards her goals.   Patient prognosis is Good.     Patient will continue to benefit from skilled outpatient physical therapy to address the deficits listed in the problem list box on initial evaluation, provide pt/family education and to maximize pt's level of independence in the home and community environment.     Patient's spiritual, cultural and educational needs considered and pt agreeable to plan of care and goals.     Anticipated barriers to physical therapy: none    Goals: Short Term Goals (2 Weeks):  1. Patient will be compliant with home exercise program to supplement therapy in promoting functional mobility. MET  2. Patient will perform sit to stand with good control to demonstrate improved core strength. Progressing  3. Patient will report no pain during thoracolumbar active range of motion to promote functional mobility. MET  Long Term Goals (4 Weeks):   1. Patient will improve FOTO score to </= 38% limited to decrease perceived limitation with maintaining/changing body position.   2. Patient will perform standing activities with good  control to demonstrate improved core strength. Progressing  3. Patient will improve impaired B lower extremity manual muscle tests to >/=4+/5 to improve strength for functional tasks. MET  4. Patient will report no pain during prolonged positions to promote return to PLOF.  Progressing        PLAN   Plan of care Certification: 9/18/2024 to 10/30/2024.     Patient is discharged at this time secondary to little relief of symptoms with physical therapy.      Angela Hale, PT

## 2024-10-16 NOTE — PROGRESS NOTES
Subjective:       Patient ID: Heaven Dennis is a 39 y.o. female.    Chief Complaint: Otalgia (Patient states she started a few days ago with pain behind the right ear, states it feels really deep in )    History of Present Illness  The patient presents for evaluation of ear pain.    She reports experiencing ear pain, a sensation of swelling, and a feeling of fullness in her ears. She has a history of hearing issues, including tinnitus, and currently feels as though there is water trapped in her ears. She does not report any nasal congestion, fever, or jaw pain. The discomfort is localized behind the ear. She has previously used Flonase nasal spray, but it did not provide any relief. She also mentions that she is unable to equalize the pressure in her ears.    She is scheduled for foot surgery on 11/04/2024 to remove a bone spur. She received an injection in her shoulder, and it is suspected to be nerve damage. She has a pessary device and could not start pelvic therapy because she is already undergoing shoulder therapy. She is scheduled for pelvic therapy in 11/2024.    SOCIAL HISTORY  She does not smoke anymore. She does not vape anymore.    FAMILY HISTORY  Her father had surgery for some form of growth in his nose.    Objective:      Physical Exam  Eardrums appear healthy bilaterally. Light reflex is good. No fluid present in either ear.  No lymph nodes detected in the neck.      Physical Exam  Vitals and nursing note reviewed.   Constitutional:       General: She is not in acute distress.     Appearance: She is well-developed. She is obese.   HENT:      Head: Normocephalic and atraumatic.      Right Ear: Tympanic membrane normal.      Left Ear: Tympanic membrane normal.      Ears:      Comments: Tenderness over right mastoid  Eyes:      Conjunctiva/sclera: Conjunctivae normal.      Pupils: Pupils are equal, round, and reactive to light.   Neck:      Thyroid: No thyromegaly.   Cardiovascular:      Rate and  Rhythm: Normal rate and regular rhythm.      Heart sounds: Normal heart sounds.   Pulmonary:      Effort: Pulmonary effort is normal. No respiratory distress.      Breath sounds: Normal breath sounds. No wheezing.   Abdominal:      General: Bowel sounds are normal.      Palpations: Abdomen is soft.      Tenderness: There is no abdominal tenderness.   Musculoskeletal:         General: Normal range of motion.      Cervical back: Normal range of motion and neck supple.   Lymphadenopathy:      Cervical: No cervical adenopathy.   Skin:     General: Skin is warm and dry.      Findings: No rash.   Neurological:      Mental Status: She is alert and oriented to person, place, and time.   Psychiatric:         Behavior: Behavior normal.         Results    Assessment:           1. Mastoiditis, unspecified laterality    2. Chronic pansinusitis    3. Acute otalgia, right        Plan:     Assessment & Plan  1. Ear pain.  The patient reports a persistent sensation of fullness and pain in the right ear, without fever or congestion. Examination reveals a healthy eardrum with no fluid or lymph node involvement. The symptoms may be due to thickened mucus in the mastoid. A low-dose steroid regimen is recommended to alleviate the sensation of fullness. Prednisone 10 mg will be administered for a week. Sinus imaging (CT of the sinuses) will be arranged to examine the mastoid and sinuses for potential polyps or thickened mucus.    2. Hearing issues.  The patient has a history of hearing issues, including ringing and a sensation of water in the ear. The fullness and pain in the ear may be affecting the eardrum's movement, contributing to hearing problems. Sinus imaging will help determine if there is an underlying cause such as a polyp. She is already scheduled for a hearing appointment.    3. Chronic pain.  The patient reports chronic pain in the back and shoulder, currently undergoing physical therapy. She is scheduled for foot surgery on  November 4 to remove a bone spur. There is a potential nerve damage in the shoulder, and she has been receiving treatment for it.        Heaven was seen today for otalgia.    Diagnoses and all orders for this visit:    Mastoiditis, unspecified laterality  -     CT Sinuses without Contrast; Future  -     predniSONE (DELTASONE) 10 MG tablet; Take 1 tablet (10 mg total) by mouth once daily.    Chronic pansinusitis  -     CT Sinuses without Contrast; Future  -     predniSONE (DELTASONE) 10 MG tablet; Take 1 tablet (10 mg total) by mouth once daily.    Acute otalgia, right  -     CT Sinuses without Contrast; Future  -     predniSONE (DELTASONE) 10 MG tablet; Take 1 tablet (10 mg total) by mouth once daily.          RTC if condition acutely worsens or any other concerns, otherwise RTC as scheduled      This note was generated with the assistance of ambient listening technology. Verbal consent was obtained by the patient and accompanying visitor(s) for the recording of patient appointment to facilitate this note. I attest to having reviewed and edited the generated note for accuracy, though some syntax or spelling errors may persist. Please contact the author of this note for any clarification.

## 2024-10-18 ENCOUNTER — HOSPITAL ENCOUNTER (OUTPATIENT)
Dept: RADIOLOGY | Facility: HOSPITAL | Age: 39
Discharge: HOME | End: 2024-10-18
Attending: FAMILY MEDICINE
Payer: OTHER GOVERNMENT

## 2024-10-18 DIAGNOSIS — J32.4 CHRONIC PANSINUSITIS: ICD-10-CM

## 2024-10-18 DIAGNOSIS — H92.01 ACUTE OTALGIA, RIGHT: ICD-10-CM

## 2024-10-18 DIAGNOSIS — H70.90 MASTOIDITIS, UNSPECIFIED LATERALITY: ICD-10-CM

## 2024-10-18 PROCEDURE — 70486 CT MAXILLOFACIAL W/O DYE: CPT | Mod: 26,,, | Performed by: RADIOLOGY

## 2024-10-18 PROCEDURE — 70486 CT MAXILLOFACIAL W/O DYE: CPT | Mod: TC

## 2024-10-18 NOTE — PROGRESS NOTES
Nothing major going on. There is some thickening of her sinuses that are likely contributing to her ear fullness, but no polyps. She should have an upcoming appt with ENT that she should bring these results with her to.  remy

## 2024-10-22 ENCOUNTER — OFFICE VISIT (OUTPATIENT)
Dept: OBSTETRICS AND GYNECOLOGY | Facility: CLINIC | Age: 39
End: 2024-10-22
Payer: OTHER GOVERNMENT

## 2024-10-22 ENCOUNTER — PATIENT MESSAGE (OUTPATIENT)
Dept: FAMILY MEDICINE | Facility: CLINIC | Age: 39
End: 2024-10-22
Payer: OTHER GOVERNMENT

## 2024-10-22 VITALS
SYSTOLIC BLOOD PRESSURE: 122 MMHG | WEIGHT: 228.81 LBS | DIASTOLIC BLOOD PRESSURE: 86 MMHG | HEIGHT: 66 IN | BODY MASS INDEX: 36.77 KG/M2 | HEART RATE: 83 BPM

## 2024-10-22 DIAGNOSIS — Z46.89 ENCOUNTER FOR FITTING AND ADJUSTMENT OF PESSARY: ICD-10-CM

## 2024-10-22 DIAGNOSIS — N39.3 SUI (STRESS URINARY INCONTINENCE, FEMALE): Primary | ICD-10-CM

## 2024-10-22 PROCEDURE — 99212 OFFICE O/P EST SF 10 MIN: CPT | Mod: S$PBB,,, | Performed by: OBSTETRICS & GYNECOLOGY

## 2024-10-22 PROCEDURE — 99999 PR PBB SHADOW E&M-EST. PATIENT-LVL III: CPT | Mod: PBBFAC,,, | Performed by: OBSTETRICS & GYNECOLOGY

## 2024-10-22 PROCEDURE — 99999PBSHW PR PBB SHADOW TECHNICAL ONLY FILED TO HB: Mod: PBBFAC,,,

## 2024-10-22 PROCEDURE — 99213 OFFICE O/P EST LOW 20 MIN: CPT | Mod: PBBFAC | Performed by: OBSTETRICS & GYNECOLOGY

## 2024-10-22 PROCEDURE — A4562 PESSARY, NON RUBBER,ANY TYPE: HCPCS | Mod: PBBFAC | Performed by: OBSTETRICS & GYNECOLOGY

## 2024-10-22 NOTE — PROGRESS NOTES
Subjective:    Patient ID: Heaven Dennis is a 39 y.o. y.o. female.     Chief Complaint:   Chief Complaint   Patient presents with    Pessary Check       History of Present Illness:  Heaven presents today for a pessary follow up. She had a #2 ring with support/knob placed. She said there is slight improvement but not much.         ROS:   Review of Systems   Constitutional:  Negative for activity change, appetite change, chills, diaphoresis, fatigue, fever and unexpected weight change.   HENT:  Negative for mouth sores and tinnitus.    Eyes:  Negative for discharge and visual disturbance.   Respiratory:  Negative for cough, shortness of breath and wheezing.    Cardiovascular:  Negative for chest pain, palpitations and leg swelling.   Gastrointestinal:  Negative for abdominal pain, bloating, blood in stool, constipation, diarrhea, nausea and vomiting.   Endocrine: Negative for diabetes, hair loss, hot flashes, hyperthyroidism and hypothyroidism.   Genitourinary:  Positive for urinary incontinence. Negative for dysuria, flank pain, frequency, genital sores, hematuria, urgency, vaginal bleeding, vaginal discharge, vaginal pain, postcoital bleeding and vaginal odor.   Musculoskeletal:  Negative for back pain, joint swelling and myalgias.   Integumentary:  Negative for rash, acne, breast mass, nipple discharge and breast skin changes.   Neurological:  Negative for seizures, syncope, numbness and headaches.   Hematological:  Negative for adenopathy. Does not bruise/bleed easily.   Psychiatric/Behavioral:  Negative for depression and sleep disturbance. The patient is not nervous/anxious.    Breast: Negative for mass, mastodynia, nipple discharge and skin changes          Objective:    Vital Signs:  Vitals:    10/22/24 1145   BP: 122/86   Pulse: 83       Physical Exam:  General:  alert, cooperative, no distress   Head Normocephalic, without obvious abnormality, atraumatic   Neck .supple, symmetrical, trachea midline    Skin:  Skin color, texture, turgor normal. No rashes or lesions   Abdomen:  soft, non-tender; bowel sounds normal   Pelvis: A #4 ring with support/knob was placed without difficulty   Extremities extremities normal, atraumatic, no cyanosis or edema   Neurologic Grossly normal            Problem List Items Addressed This Visit    None  Visit Diagnoses       DUONG (stress urinary incontinence, female)    -  Primary    Encounter for fitting and adjustment of pessary              RTC in 4 weeks

## 2024-10-23 ENCOUNTER — TELEPHONE (OUTPATIENT)
Dept: FAMILY MEDICINE | Facility: CLINIC | Age: 39
End: 2024-10-23
Payer: OTHER GOVERNMENT

## 2024-10-23 DIAGNOSIS — H92.09 OTALGIA, UNSPECIFIED LATERALITY: Primary | ICD-10-CM

## 2024-10-23 NOTE — TELEPHONE ENCOUNTER
Patient states she has completed all the medication from previous visit and is still having ear pain. Please advise.

## 2024-10-25 ENCOUNTER — CLINICAL SUPPORT (OUTPATIENT)
Dept: REHABILITATION | Facility: HOSPITAL | Age: 39
End: 2024-10-25
Payer: OTHER GOVERNMENT

## 2024-10-25 DIAGNOSIS — R29.898 WEAKNESS OF RIGHT SHOULDER: ICD-10-CM

## 2024-10-25 DIAGNOSIS — M25.311 SHOULDER INSTABILITY, RIGHT: ICD-10-CM

## 2024-10-25 DIAGNOSIS — M25.511 ACUTE PAIN OF RIGHT SHOULDER: Primary | ICD-10-CM

## 2024-10-25 PROCEDURE — 97110 THERAPEUTIC EXERCISES: CPT | Mod: PN

## 2024-10-25 PROCEDURE — 97035 APP MDLTY 1+ULTRASOUND EA 15: CPT | Mod: PN

## 2024-10-25 PROCEDURE — 97032 APPL MODALITY 1+ESTIM EA 15: CPT | Mod: PN

## 2024-10-25 NOTE — TELEPHONE ENCOUNTER
Because of her pain and her hearing loss and with normal imaging, I will send her to ent.  Referral placed to see dr. Rodney.  mh

## 2024-10-29 ENCOUNTER — PATIENT MESSAGE (OUTPATIENT)
Dept: FAMILY MEDICINE | Facility: CLINIC | Age: 39
End: 2024-10-29
Payer: OTHER GOVERNMENT

## 2024-11-01 ENCOUNTER — CLINICAL SUPPORT (OUTPATIENT)
Dept: FAMILY MEDICINE | Facility: CLINIC | Age: 39
End: 2024-11-01
Payer: OTHER GOVERNMENT

## 2024-11-01 ENCOUNTER — OFFICE VISIT (OUTPATIENT)
Dept: FAMILY MEDICINE | Facility: CLINIC | Age: 39
End: 2024-11-01
Payer: OTHER GOVERNMENT

## 2024-11-01 VITALS
HEART RATE: 80 BPM | DIASTOLIC BLOOD PRESSURE: 82 MMHG | RESPIRATION RATE: 16 BRPM | BODY MASS INDEX: 36.74 KG/M2 | SYSTOLIC BLOOD PRESSURE: 108 MMHG | HEIGHT: 66 IN | WEIGHT: 228.63 LBS

## 2024-11-01 DIAGNOSIS — H92.01 ACUTE OTALGIA, RIGHT: Primary | ICD-10-CM

## 2024-11-01 DIAGNOSIS — Z11.4 ENCOUNTER FOR SCREENING FOR HIV: ICD-10-CM

## 2024-11-01 DIAGNOSIS — R73.03 PREDIABETES: ICD-10-CM

## 2024-11-01 PROCEDURE — 87389 HIV-1 AG W/HIV-1&-2 AB AG IA: CPT | Performed by: FAMILY MEDICINE

## 2024-11-01 PROCEDURE — 99213 OFFICE O/P EST LOW 20 MIN: CPT | Mod: PBBFAC | Performed by: FAMILY MEDICINE

## 2024-11-01 PROCEDURE — 83036 HEMOGLOBIN GLYCOSYLATED A1C: CPT | Performed by: FAMILY MEDICINE

## 2024-11-01 PROCEDURE — 99999 PR PBB SHADOW E&M-EST. PATIENT-LVL III: CPT | Mod: PBBFAC,,, | Performed by: FAMILY MEDICINE

## 2024-11-01 RX ORDER — AZELASTINE 1 MG/ML
1-2 SPRAY, METERED NASAL 2 TIMES DAILY
Qty: 30 ML | Refills: 5 | Status: SHIPPED | OUTPATIENT
Start: 2024-11-01 | End: 2024-12-01

## 2024-11-01 RX ORDER — CIPROFLOXACIN AND DEXAMETHASONE 3; 1 MG/ML; MG/ML
4 SUSPENSION/ DROPS AURICULAR (OTIC) 2 TIMES DAILY
Qty: 7.5 ML | Refills: 0 | Status: SHIPPED | OUTPATIENT
Start: 2024-11-01 | End: 2024-11-11

## 2024-11-02 LAB
ESTIMATED AVG GLUCOSE: 108 MG/DL (ref 68–131)
HBA1C MFR BLD: 5.4 % (ref 4–5.6)
HIV 1+2 AB+HIV1 P24 AG SERPL QL IA: NORMAL

## 2024-11-05 ENCOUNTER — PATIENT MESSAGE (OUTPATIENT)
Dept: FAMILY MEDICINE | Facility: CLINIC | Age: 39
End: 2024-11-05
Payer: OTHER GOVERNMENT

## 2024-11-05 DIAGNOSIS — M77.9 BONE SPUR: Primary | ICD-10-CM

## 2024-11-06 PROBLEM — M77.9 BONE SPUR: Status: ACTIVE | Noted: 2024-11-06

## 2024-11-07 ENCOUNTER — TELEPHONE (OUTPATIENT)
Dept: FAMILY MEDICINE | Facility: CLINIC | Age: 39
End: 2024-11-07
Payer: OTHER GOVERNMENT

## 2024-11-07 ENCOUNTER — PATIENT MESSAGE (OUTPATIENT)
Dept: FAMILY MEDICINE | Facility: CLINIC | Age: 39
End: 2024-11-07
Payer: OTHER GOVERNMENT

## 2024-11-07 NOTE — TELEPHONE ENCOUNTER
----- Message from Julio sent at 2024 12:37 PM CST -----  Contact: pt  Heaven Dennis  MRN: [id number]  : 1985  PCP: Leilani Pat  Home Phone      Not on file.  Work Phone      Not on file.  Mobile          [phone]      MESSAGE:     Pt called wanting to speak with nurse about referral to Dr. Rodney's office. They stated they never received the referral.          Please advise   [phone]

## 2024-11-10 DIAGNOSIS — R73.03 PREDIABETES: ICD-10-CM

## 2024-11-10 RX ORDER — DULAGLUTIDE 0.75 MG/.5ML
0.75 INJECTION, SOLUTION SUBCUTANEOUS
Qty: 12 PEN | Refills: 1 | Status: SHIPPED | OUTPATIENT
Start: 2024-11-10 | End: 2025-11-10

## 2024-11-10 NOTE — TELEPHONE ENCOUNTER
Refill Decision Note   Heaven Dennis  is requesting a refill authorization.  Brief Assessment and Rationale for Refill:  Approve     Medication Therapy Plan:         Comments:     Note composed:5:36 AM 11/10/2024

## 2024-11-10 NOTE — TELEPHONE ENCOUNTER
No care due was identified.  [facility] Embedded Care Due Messages. Reference number: [id number].   11/10/2024 1:23:17 AM CST

## 2024-11-13 DIAGNOSIS — R73.03 PREDIABETES: ICD-10-CM

## 2024-11-13 RX ORDER — DULAGLUTIDE 0.75 MG/.5ML
0.75 INJECTION, SOLUTION SUBCUTANEOUS
Qty: 12 PEN | Refills: 1 | OUTPATIENT
Start: 2024-11-13 | End: 2025-11-13

## 2024-11-13 NOTE — TELEPHONE ENCOUNTER
No care due was identified.  Health [facility] Embedded Care Due Messages. Reference number: [id number].   11/13/2024 9:18:08 AM CST

## 2024-11-14 NOTE — TELEPHONE ENCOUNTER
Spoke to patient, advised her that we have the approval and Ms Jeffers is faxing it today. She stated understanding.

## 2024-11-14 NOTE — TELEPHONE ENCOUNTER
----- Message from Maxwell sent at 2024  1:39 PM CST -----  Contact: Patient  Heaven Dennis  MRN: [id number]  : 1985  PCP: Leilani Pat  Home Phone      Not on file.  Work Phone      Not on file.  Mobile          [phone]      MESSAGE: waiting on [facility] referral to Dr Kieran Rodney - [facility] says approved - Dr Rodney's office say they have not received anything -- fax to [phone]    Call [phone]    PCP: Adilia

## 2024-12-06 ENCOUNTER — TELEPHONE (OUTPATIENT)
Dept: SPORTS MEDICINE | Facility: CLINIC | Age: 39
End: 2024-12-06
Payer: OTHER GOVERNMENT

## 2024-12-10 ENCOUNTER — PROCEDURE VISIT (OUTPATIENT)
Dept: NEUROLOGY | Facility: CLINIC | Age: 39
End: 2024-12-10
Payer: OTHER GOVERNMENT

## 2024-12-10 DIAGNOSIS — R20.2 PARESTHESIAS IN RIGHT HAND: ICD-10-CM

## 2024-12-10 PROCEDURE — 95886 MUSC TEST DONE W/N TEST COMP: CPT | Mod: 26,S$PBB,, | Performed by: PHYSICAL MEDICINE & REHABILITATION

## 2024-12-10 PROCEDURE — 95913 NRV CNDJ TEST 13/> STUDIES: CPT | Mod: 26,S$PBB,, | Performed by: PHYSICAL MEDICINE & REHABILITATION

## 2024-12-10 PROCEDURE — 95886 MUSC TEST DONE W/N TEST COMP: CPT | Mod: PBBFAC,PN | Performed by: PHYSICAL MEDICINE & REHABILITATION

## 2024-12-10 PROCEDURE — 95913 NRV CNDJ TEST 13/> STUDIES: CPT | Mod: PBBFAC,PN | Performed by: PHYSICAL MEDICINE & REHABILITATION

## 2024-12-10 NOTE — PROCEDURES
Test Date:  12/10/2024    Patient: heaven belcher : 1985 Physician: Asad Dudley D.O.   ID#: [id number] SEX: Female Ref. Phys: Fercho Alexandra MD     HPI: Heaven Belcher is a 39 y.o.female who presents for NCS/EMG to evaluate for TOS vs cervical radiculopathy vs entrapment neuropathy of RUE.      PROCEDURE:  Prior to the procedure, the procedure was discussed in detail with the patient.  All questions were answered, and verbal consent was obtained.  For nerve conduction studies, a combination of surface electrodes, bar electrodes, and/or ring electrodes were used as needed.  For needle EMG, each site was cleaned and prepped in usual fashion with an alcohol pad.  A monopolar needle (28G) was used.  There was no significant bleeding, and bandages were applied as needed.  The procedure was tolerated without adverse effect.  The patient was instructed on post-procedure care including ice if needed for 10-15 minutes up to 4 times/day for any sore muscles.  I discussed with the patient that the data would be reviewed and a report sent to the referring provider, where any follow up questions regarding next steps should be directed.        NCV & EMG Findings:  All nerve conduction studies (as indicated in the following tables) were within normal limits.  All examined muscles (as indicated in the following table) showed no evidence of electrical instability.      IMPRESSIONS:  This is a normal electrophysiologic study of the right upper extremity.          ___________________________  Asad Dudley D.O.        NCS+  Motor Nerve Results      Latency Amplitude F-Lat Segment Distance CV Comment   Site (ms) Norm (mV) Norm (ms)  (cm) (m/s) Norm    Left Median (APB)   Wrist 3.5  < 4.4 9.8  > 4.2         Elbow 7.2 - 9.8 -  Elbow-Wrist 25 68  > 51    Right Median (APB)   Wrist 3.6  < 4.4 11.4  > 4.2         Elbow 7.3 - 11.2 -  Elbow-Wrist 23 62  > 51    Left Ulnar (ADM)   Wrist 2.3  < 3.7 8.7  > 3.0          Bel Elbow 6.1 - 8.4 -  Bel Elbow-Wrist 23 61  > 52    Right Ulnar (ADM)   Wrist 2.5  < 3.7 10.7  > 3.0         Bel Elbow 6.3 - 9.5 -  Bel Elbow-Wrist 25.5 67  > 52    Abv Elbow 7.6 - 9.3 -  Abv Elbow-Bel Elbow 9 69  > 43      Sensory Nerve Results      Start Lat Latency (Peak) Amplitude (P-P) Segment Distance Start CV Comment   Site (ms) Norm (ms) (µV) Norm  (cm) (m/s) Norm    Left Median (Rec:Dig II)   Wrist 2.5  < 3.3 3.2 46  > 13 Wrist-Dig II 14 56  > 42    Right Median (Rec:Dig II)   Wrist 2.6  < 3.3 3.3 58  > 13 Wrist-Dig II 14 54  > 42    Left Ulnar (Rec:Dig V)   Wrist 2.1  < 3.1 2.8 43  > 8 Wrist-Dig V 14 67  > 45    Right Ulnar (Rec:Dig V)   Wrist 2.1  < 3.1 2.8 55  > 8 Wrist-Dig V 14 67  > 45    Right Dorsal Ulnar Cutaneous (Rec:Dorsum 5th MC)   Wrist 0.88 - 1.65 10 - Wrist-Dorsum 5th MC - - -    Left Radial (Rec:Wrist)   Forearm 1.35  < 2.2 1.98 26  > 11 Forearm-Wrist 10 74 -    Right Radial (Rec:Wrist)   Forearm 1.35  < 2.2 1.83 32  > 11 Forearm-Wrist 10 74 -    Right Lateral Antebrachial Cutaneous (Rec:Lat Forearm)   Lat Biceps 1.40 - 1.93 15  > 6 Lat Biceps-Lat Forearm 10 71 -    Right Medial Antebrachial Cutaneous (Rec:Med Forearm)   Elbow 1.30 - 1.93 23  > 3 Elbow-Med Forearm 10 77 -      EMG+     Side Muscle Nerve Root Ins Act Fibs Psw Amp Dur Poly Recrt Int Pat Comment   Right Deltoid Axillary C5-C6 Nml Nml Nml Nml Nml 0 Nml Nml    Right Biceps Musculocut C5-C6 Nml Nml Nml Nml Nml 0 Nml Nml    Right Triceps Radial C6-C8 Nml Nml Nml Nml Nml 0 Nml Nml    Right Pronator Teres Median C6-C7 Nml Nml Nml Nml Nml 0 Nml Nml    Right APB Median C8-T1 Nml Nml Nml Nml Nml 0 Nml Nml    Right FDI Ulnar C8-T1 Nml Nml Nml Nml Nml 0 Nml Nml            Waveforms:    Motor              Sensory

## 2024-12-15 DIAGNOSIS — H92.01 ACUTE OTALGIA, RIGHT: ICD-10-CM

## 2024-12-15 NOTE — TELEPHONE ENCOUNTER
Care Due:                  Date            Visit Type   Department     Provider  --------------------------------------------------------------------------------                                EP -                              PRIMARY      [facility]  Last Visit: 11-      CARE ([facility])   MEDICINE       Leilani Pat  Next Visit: None Scheduled  None         None Found                                                            Last  Test          Frequency    Reason                     Performed    Due Date  --------------------------------------------------------------------------------    CBC.........  12 months..  ibuprofen................  12- 12-    Health [facility] Embedded Care Due Messages. Reference number: [id number].   12/15/2024 9:37:21 AM CST

## 2024-12-15 NOTE — TELEPHONE ENCOUNTER
No care due was identified.  Health [facility] Embedded Care Due Messages. Reference number: [id number].   12/15/2024 9:38:12 AM CST   Refill request for:  Junel FE   Last office visit:  5/20/19  Next office visit:  8/6/2020  Last refilled:  5/8/2020  Last pap:  6/13/17    Filled on 5/9/2020  #84 per [facility].  Per last CPE note, patient skips placebo pills.  Ok for 1 or 3 mo refill to get patient to CPE?

## 2024-12-16 RX ORDER — IBUPROFEN 800 MG/1
800 TABLET ORAL 3 TIMES DAILY
Qty: 30 TABLET | Refills: 0 | Status: SHIPPED | OUTPATIENT
Start: 2024-12-16

## 2024-12-16 RX ORDER — AZELASTINE 1 MG/ML
1-2 SPRAY, METERED NASAL 2 TIMES DAILY
Qty: 30 ML | Refills: 5 | Status: SHIPPED | OUTPATIENT
Start: 2024-12-16 | End: 2025-01-15

## 2025-02-27 RX ORDER — IBUPROFEN 800 MG/1
800 TABLET ORAL 3 TIMES DAILY
Qty: 30 TABLET | Refills: 0 | Status: SHIPPED | OUTPATIENT
Start: 2025-02-27

## 2025-02-27 NOTE — TELEPHONE ENCOUNTER
Care Due:                  Date            Visit Type   Department     Provider  --------------------------------------------------------------------------------                                EP -                              PRIMARY      [facility]  Last Visit: 11-      CARE ([facility])   MEDICINE       Leilani Pat  Next Visit: None Scheduled  None         None Found                                                            Last  Test          Frequency    Reason                     Performed    Due Date  --------------------------------------------------------------------------------    CBC.........  12 months..  ibuprofen................  Not Found    Overdue    Cr..........  12 months..  ibuprofen................  05- 04-    HBA1C.......  6 months...  TRULICITY................  11- 05-    [facility] [employer] Embedded Care Due Messages. Reference number: [id number].   2/27/2025 6:17:49 AM CST

## 2025-07-31 DIAGNOSIS — Z12.31 OTHER SCREENING MAMMOGRAM: ICD-10-CM
